# Patient Record
Sex: FEMALE | Race: WHITE | Employment: OTHER | URBAN - METROPOLITAN AREA
[De-identification: names, ages, dates, MRNs, and addresses within clinical notes are randomized per-mention and may not be internally consistent; named-entity substitution may affect disease eponyms.]

---

## 2017-04-13 ENCOUNTER — HOSPITAL ENCOUNTER (OUTPATIENT)
Dept: MAMMOGRAPHY | Age: 82
Discharge: HOME | End: 2017-04-13
Attending: FAMILY MEDICINE
Payer: MEDICARE

## 2017-04-13 DIAGNOSIS — Z78.0 POSTMENOPAUSAL: ICD-10-CM

## 2017-04-13 PROCEDURE — 77080 DXA BONE DENSITY AXIAL: CPT

## 2018-11-15 ENCOUNTER — HOSPITAL ENCOUNTER (OUTPATIENT)
Dept: GENERAL RADIOLOGY | Age: 83
End: 2018-11-15
Attending: FAMILY MEDICINE
Payer: MEDICARE

## 2018-11-15 ENCOUNTER — HOSPITAL ENCOUNTER (OUTPATIENT)
Dept: GENERAL RADIOLOGY | Age: 83
Discharge: HOME | End: 2018-11-15
Attending: FAMILY MEDICINE
Payer: MEDICARE

## 2018-11-15 DIAGNOSIS — R05.9 COUGH: ICD-10-CM

## 2018-11-15 PROCEDURE — 74247 XR UPPER GI W KUB AIR CONT: CPT

## 2018-11-15 PROCEDURE — 74011000255 HC RX REV CODE- 255: Performed by: FAMILY MEDICINE

## 2018-11-15 PROCEDURE — 74011000250 HC RX REV CODE- 250: Performed by: FAMILY MEDICINE

## 2018-11-15 RX ADMIN — BARIUM SULFATE 135 ML: 980 POWDER, FOR SUSPENSION ORAL at 13:03

## 2018-11-15 RX ADMIN — ANTACID/ANTIFLATULENT 4 G: 380; 550; 10; 10 GRANULE, EFFERVESCENT ORAL at 13:02

## 2019-02-13 ENCOUNTER — HOSPITAL ENCOUNTER (OUTPATIENT)
Dept: CT IMAGING | Age: 84
Discharge: HOME | End: 2019-02-13
Attending: FAMILY MEDICINE
Payer: MEDICARE

## 2019-02-13 DIAGNOSIS — R05.9 COUGH: ICD-10-CM

## 2019-02-13 DIAGNOSIS — J44.1 CHRONIC OBSTRUCTIVE PULMONARY DISEASE WITH ACUTE EXACERBATION (HCC): ICD-10-CM

## 2019-02-13 DIAGNOSIS — R93.89 ABNORMAL CHEST X-RAY: ICD-10-CM

## 2019-02-13 LAB — CREAT BLD-MCNC: 1.1 MG/DL (ref 0.8–1.5)

## 2019-02-13 PROCEDURE — 82565 ASSAY OF CREATININE: CPT

## 2019-02-13 RX ORDER — SODIUM CHLORIDE 0.9 % (FLUSH) 0.9 %
10 SYRINGE (ML) INJECTION
Status: COMPLETED | OUTPATIENT
Start: 2019-02-13 | End: 2019-02-13

## 2019-02-13 RX ADMIN — Medication 10 ML: at 13:05

## 2019-02-15 ENCOUNTER — HOSPITAL ENCOUNTER (OUTPATIENT)
Dept: LAB | Age: 84
Discharge: HOME | End: 2019-02-15
Payer: MEDICARE

## 2019-02-15 DIAGNOSIS — R93.89 ABNORMAL CT SCAN: ICD-10-CM

## 2019-02-15 PROCEDURE — 87116 MYCOBACTERIA CULTURE: CPT

## 2019-02-15 PROCEDURE — 87070 CULTURE OTHR SPECIMN AEROBIC: CPT

## 2019-02-15 PROCEDURE — 87102 FUNGUS ISOLATION CULTURE: CPT

## 2019-02-17 LAB
BACTERIA SPEC CULT: NORMAL
GRAM STN SPEC: NORMAL
SERVICE CMNT-IMP: NORMAL

## 2019-02-19 ENCOUNTER — HOSPITAL ENCOUNTER (OUTPATIENT)
Dept: LAB | Age: 84
Discharge: HOME | End: 2019-02-19
Payer: MEDICARE

## 2019-02-19 DIAGNOSIS — J47.0 BRONCHIECTASIS WITH ACUTE LOWER RESPIRATORY INFECTION (HCC): ICD-10-CM

## 2019-02-19 DIAGNOSIS — Z86.2 HISTORY OF GRANULOMATOUS DISEASE: ICD-10-CM

## 2019-02-19 DIAGNOSIS — R76.11 POSITIVE PPD: ICD-10-CM

## 2019-02-19 PROCEDURE — 86480 TB TEST CELL IMMUN MEASURE: CPT

## 2019-02-19 PROCEDURE — 87116 MYCOBACTERIA CULTURE: CPT

## 2019-02-19 PROCEDURE — 36415 COLL VENOUS BLD VENIPUNCTURE: CPT

## 2019-02-22 LAB
M TB IFN-G BLD-IMP: NEGATIVE
QUANTIFERON CRITERIA, QFI1T: NORMAL
QUANTIFERON INCUBATION, QF1T: NORMAL
QUANTIFERON MITOGEN VALUE: 6.51 IU/ML
QUANTIFERON NIL VALUE: 0.01 IU/ML
QUANTIFERON TB1 AG: 0.07 IU/ML
QUANTIFERON TB2 AG: 0.08 IU/ML

## 2019-02-28 NOTE — PROGRESS NOTES
Pt was notified that the sputum stains and quantiferon suggests there is no need for TB precaution or concern. She was informed that when final cultures are available we will call her.

## 2019-03-16 LAB
FUNGUS CULTURE, RFCO2T: NEGATIVE
FUNGUS SMEAR, RFCO1T: NORMAL
FUNGUS SPEC CULT: NORMAL
FUNGUS STAIN, 188244: NORMAL
REFLEX TO ID, RFCO3T: NORMAL
SPECIMEN SOURCE: NORMAL
SPECIMEN SOURCE: NORMAL

## 2019-03-27 PROBLEM — R91.1 PULMONARY NODULE: Status: ACTIVE | Noted: 2019-03-27

## 2019-03-27 PROBLEM — R05.3 CHRONIC COUGH: Status: ACTIVE | Noted: 2019-03-27

## 2019-03-27 PROBLEM — J47.9 BRONCHIECTASIS WITHOUT COMPLICATION (HCC): Status: ACTIVE | Noted: 2019-03-27

## 2019-03-29 LAB
ACID FAST STN SPEC: NEGATIVE
MYCOBACTERIUM SPEC QL CULT: NEGATIVE
SPECIMEN PREPARATION: NORMAL
SPECIMEN SOURCE: NORMAL

## 2019-04-03 LAB
ACID FAST STN SPEC: NEGATIVE
ACID FAST STN SPEC: NEGATIVE
MYCOBACTERIUM SPEC QL CULT: NEGATIVE
MYCOBACTERIUM SPEC QL CULT: NEGATIVE
SPECIMEN PREPARATION: NORMAL
SPECIMEN PREPARATION: NORMAL
SPECIMEN SOURCE: NORMAL
SPECIMEN SOURCE: NORMAL

## 2019-04-22 ENCOUNTER — HOSPITAL ENCOUNTER (OUTPATIENT)
Dept: MAMMOGRAPHY | Age: 84
Discharge: HOME | End: 2019-04-22
Attending: FAMILY MEDICINE
Payer: MEDICARE

## 2019-04-22 DIAGNOSIS — M94.9 DISORDER OF BONE AND CARTILAGE: ICD-10-CM

## 2019-04-22 DIAGNOSIS — M89.9 DISORDER OF BONE AND CARTILAGE: ICD-10-CM

## 2019-04-22 PROCEDURE — 77080 DXA BONE DENSITY AXIAL: CPT

## 2019-06-06 ENCOUNTER — HOSPITAL ENCOUNTER (OUTPATIENT)
Dept: CT IMAGING | Age: 84
Discharge: HOME | End: 2019-06-06
Attending: INTERNAL MEDICINE
Payer: MEDICARE

## 2019-06-06 DIAGNOSIS — R91.1 PULMONARY NODULE: ICD-10-CM

## 2019-06-06 DIAGNOSIS — J47.9 BRONCHIECTASIS WITHOUT COMPLICATION (HCC): ICD-10-CM

## 2019-06-06 PROCEDURE — 71250 CT THORAX DX C-: CPT

## 2019-06-10 NOTE — PROGRESS NOTES
Pt was notified that there is scarring in the upper lungs. One nodule has improved. There are more inflammatory areas in the bottom aspects of the lungs. Pt verbalized understanding. Pt has questions about the use of cough suppressants. She is requesting a return call.

## 2019-06-10 NOTE — PROGRESS NOTES
Reviewed for Dr. Minor King due to her vacation--CT is reviewed--scarring in upper lungs. One nodule is improved. There appears to be more inflammatory areas in the bottom aspects of lungs. This will be addressed at upcoming appt. Thank you.

## 2020-02-03 NOTE — PROGRESS NOTES
Physical Therapy Daily Treatment    Visit Count: 2    Precautions: Plan of Care: 1/27/2020 Through: 4/20/2020  Insurance Information: 20 Authorized  Referred by: Yousif Beck MD; Next provider visit (if known/scheduled): 2/12/2020  Medical Diagnosis (from order):  S/p arthroscopy of left hip  Treatment Diagnosis: Pelvic floor dysfunction and hip weakness with increased pain/symptoms, impaired muscle length/flexibility, impaired activity tolerance     Date of Surgery: 11/20/2019 Surgery Performed: Reconstruction, Pelvis; Physician Guidelines released from pelvic restrictions 1/21/2020   Diagnosis Precautions: 10-20 lifting restriction and then activity as tolerated easing back into normal activity.   Chart reviewed at time of initial evaluation (relevant co-morbidities, allergies, tests and medications listed): 7/25/2019 left hip arthroscopy, rim trimming, labral repair, femoral osteoplasty.  Avoid lunges/squats due to right knee pain.     SUBJECTIVE   Reports she has tightness in the left hip.    Current Pain (0-10 scale): 4   Functional Change: no change      Pain:   Left anterior hip pain aggravated by needing to kneel after recent surgery.  Onset about 3 weeks ago and staying about the same since onset.  Pain rated 4/10.  Ice helps.  Reports stiffness with sit to stand, and going up and down steps.      Patient Goals: to strengthen hip without compromising gynecological surgery.      OBJECTIVE   Pelvic Floor Assessment EXTERNAL/Visual Perineal Exam:           Finding Comment   Skin Integrity intact      Contraction Response substituted     Neuro/Anal Reflex       Tone with palpation normal        Range of Motion (degrees)    Left Right   Date       Hip Flexion (110-120)  125     Hip Extension (10-15)       Hip Abduction (30-50) 50 45   Hip Adduction (30)       Hip Internal Rotation (30-40) 38 40   Hip External Rotation (40-60) 40 40   Knee Flexion (135)       Knee Extension (0-5)       Ankle Dorsiflexion  Please notify patient that sputum stains and quantiferon suggests there is no need for TB precaution or concern. Will review final cultures when available.   Meghan Schofield MD (20)       Ankle Plantar Flexion (50)       Ankle Inversion (35)       Ankle Eversion (15)       Reported in degrees, active range of motion recorded unless noted as AA=active assistive or P=passive; standard testing positions unless otherwise noted, norms included in ( ); *=pain   Only those motions that were assessed are noted.  Muscle Flexibility   only deficits assessed reported below:    Left Right   Left Right   Adductors (long) x x Adductors (short) x xx   Piriformis      Hamstring       Iliopsoas x   Iliotibial Band       Rectus Femoris/Quadriceps     Gastrocnemius       Soleus             X=impairment assessed; amount of impairment maybe indicated by min=minimal impairment, mod=moderate impairment, sev=severe impairment; hamstring may be reported in 90/90 test as indicated by degrees     Biofeedback: per initial eval  Verbal informed consent was received today from patient for external pelvic floor muscle assessment and treatment. Patient was given alternative options. Benefits and drawbacks were explained. Third person was offered to be in room during session, patient declined.. Patient provided continual consent prior to and during evaluation and treatment.       Pelvic floor electrode: surface emg  Equipment: CTS 1500       PELVIC FLOOR   Baseline Resting  2.3 (normal is 2-4 microvolts)    Pelvic Floor Contraction    weak, isolated    Pelvic Floor Endurance  4 seconds  Repeat 3-5 times   Pelvic Floor Relaxation  delayed relaxation    Exercises See exercise instruction below   EMG readings in microvolts             Treatment   Manual Therapy:   [place] femoral grade 3 joint mobs left hip distraction, A/P, PA, lateral glide, internal rotation and external rotation.        Access Code: [email or web address]   URL: [email or web address]   Date: 02/03/2020   Prepared by: Ember Travis     Exercises   Single Knee to Chest Stretch - 3 reps - 1 sets - 30 hold - 1x daily - 5x weekly   Supine Butterfly Groin Stretch  - 3 reps - 1 sets - 30 hold - 1x daily - 5x weekly   Supine Lower Trunk Rotation - 3 reps - 1 sets - 30 hold - 1x daily - 5x weekly     Skilled input: as detailed above       Home Program:   As above    Writer verbally educated the patient and received verbal consent from the patient on hand placement, positioning of patient, and techniques to be performed today including therapist position for techniques, hand placement and palpation for techniques as described above and how they are pertinent to the patient's plan of care.      Suggestions for next session as indicated: progress per plan of care, continue hip joint mobilization, progress per plan of care, Progress pelvic floor muscle  to sitting if patient able. Continue biofeedback to assist in re-education of pelvic floor.  Progress hip/core strengthening once pt has re-established good strength of pelvic floor muscle        ASSESSMENT   Tolerated treatment well reporting hip felt better   Pain after treatment (patient reported, 0-10 scale):  Not rated  Result of above outlined education: Verbalizes understanding and Demonstrates understanding    Procedures and total treatment time documented in Time Entry flowsheet.

## 2020-07-02 ENCOUNTER — HOSPITAL ENCOUNTER (OUTPATIENT)
Dept: GENERAL RADIOLOGY | Age: 85
Discharge: HOME | End: 2020-07-02
Payer: MEDICARE

## 2020-07-02 DIAGNOSIS — R05.9 COUGH: ICD-10-CM

## 2020-07-02 PROCEDURE — 71046 X-RAY EXAM CHEST 2 VIEWS: CPT

## 2021-12-27 ENCOUNTER — HOSPITAL ENCOUNTER (OUTPATIENT)
Dept: ULTRASOUND IMAGING | Age: 86
Discharge: HOME | End: 2021-12-27
Attending: FAMILY MEDICINE
Payer: MEDICARE

## 2021-12-27 DIAGNOSIS — I73.9 ARTERIAL INSUFFICIENCY OF LOWER EXTREMITY (HCC): ICD-10-CM

## 2021-12-27 PROCEDURE — 93925 LOWER EXTREMITY STUDY: CPT

## 2022-01-11 ENCOUNTER — HOSPITAL ENCOUNTER (OUTPATIENT)
Dept: GENERAL RADIOLOGY | Age: 87
Discharge: HOME | End: 2022-01-11
Payer: MEDICARE

## 2022-01-11 DIAGNOSIS — R06.02 SOB (SHORTNESS OF BREATH): ICD-10-CM

## 2022-01-11 PROCEDURE — 71046 X-RAY EXAM CHEST 2 VIEWS: CPT

## 2022-01-21 ENCOUNTER — APPOINTMENT (OUTPATIENT)
Dept: ULTRASOUND IMAGING | Age: 87
DRG: 191 | End: 2022-01-21
Attending: INTERNAL MEDICINE
Payer: MEDICARE

## 2022-01-21 ENCOUNTER — HOSPITAL ENCOUNTER (INPATIENT)
Age: 87
LOS: 3 days | Discharge: HOME | DRG: 191 | End: 2022-01-24
Attending: EMERGENCY MEDICINE | Admitting: INTERNAL MEDICINE
Payer: MEDICARE

## 2022-01-21 ENCOUNTER — APPOINTMENT (OUTPATIENT)
Dept: GENERAL RADIOLOGY | Age: 87
DRG: 191 | End: 2022-01-21
Attending: EMERGENCY MEDICINE
Payer: MEDICARE

## 2022-01-21 DIAGNOSIS — J47.1 BRONCHIECTASIS WITH ACUTE EXACERBATION (HCC): Primary | ICD-10-CM

## 2022-01-21 DIAGNOSIS — R09.02 HYPOXIA: ICD-10-CM

## 2022-01-21 DIAGNOSIS — R60.9 PERIPHERAL EDEMA: ICD-10-CM

## 2022-01-21 DIAGNOSIS — N17.9 AKI (ACUTE KIDNEY INJURY) (HCC): ICD-10-CM

## 2022-01-21 PROBLEM — D64.9 ANEMIA: Status: ACTIVE | Noted: 2022-01-21

## 2022-01-21 PROBLEM — J44.1 COPD EXACERBATION (HCC): Status: ACTIVE | Noted: 2022-01-21

## 2022-01-21 PROBLEM — R60.0 LOWER EXTREMITY EDEMA: Status: ACTIVE | Noted: 2022-01-21

## 2022-01-21 PROBLEM — J18.9 COMMUNITY ACQUIRED PNEUMONIA: Status: ACTIVE | Noted: 2022-01-21

## 2022-01-21 LAB
ALBUMIN SERPL-MCNC: 2.7 G/DL (ref 3.2–4.6)
ALBUMIN/GLOB SERPL: 0.6 {RATIO} (ref 1.2–3.5)
ALP SERPL-CCNC: 63 U/L (ref 50–130)
ALT SERPL-CCNC: 19 U/L (ref 12–65)
ANION GAP SERPL CALC-SCNC: 5 MMOL/L (ref 7–16)
AST SERPL-CCNC: 30 U/L (ref 15–37)
ATRIAL RATE: 99 BPM
BASOPHILS # BLD: 0.1 K/UL (ref 0–0.2)
BASOPHILS NFR BLD: 1 % (ref 0–2)
BILIRUB SERPL-MCNC: 0.3 MG/DL (ref 0.2–1.1)
BNP SERPL-MCNC: 1378 PG/ML
BUN SERPL-MCNC: 30 MG/DL (ref 8–23)
CALCIUM SERPL-MCNC: 8.9 MG/DL (ref 8.3–10.4)
CALCULATED P AXIS, ECG09: 0 DEGREES
CALCULATED R AXIS, ECG10: 3 DEGREES
CALCULATED T AXIS, ECG11: 65 DEGREES
CHLORIDE SERPL-SCNC: 103 MMOL/L (ref 98–107)
CO2 SERPL-SCNC: 27 MMOL/L (ref 21–32)
COVID-19 RAPID TEST, COVR: NOT DETECTED
CREAT SERPL-MCNC: 1.93 MG/DL (ref 0.6–1)
DIAGNOSIS, 93000: NORMAL
DIFFERENTIAL METHOD BLD: ABNORMAL
EOSINOPHIL # BLD: 0.3 K/UL (ref 0–0.8)
EOSINOPHIL NFR BLD: 4 % (ref 0.5–7.8)
ERYTHROCYTE [DISTWIDTH] IN BLOOD BY AUTOMATED COUNT: 13 % (ref 11.9–14.6)
GLOBULIN SER CALC-MCNC: 4.9 G/DL (ref 2.3–3.5)
GLUCOSE SERPL-MCNC: 125 MG/DL (ref 65–100)
HCT VFR BLD AUTO: 30.2 % (ref 35.8–46.3)
HGB BLD-MCNC: 9.7 G/DL (ref 11.7–15.4)
IMM GRANULOCYTES # BLD AUTO: 0 K/UL (ref 0–0.5)
IMM GRANULOCYTES NFR BLD AUTO: 1 % (ref 0–5)
LACTATE SERPL-SCNC: 0.7 MMOL/L (ref 0.4–2)
LYMPHOCYTES # BLD: 0.2 K/UL (ref 0.5–4.6)
LYMPHOCYTES NFR BLD: 3 % (ref 13–44)
MCH RBC QN AUTO: 29 PG (ref 26.1–32.9)
MCHC RBC AUTO-ENTMCNC: 32.1 G/DL (ref 31.4–35)
MCV RBC AUTO: 90.1 FL (ref 79.6–97.8)
MONOCYTES # BLD: 0.7 K/UL (ref 0.1–1.3)
MONOCYTES NFR BLD: 9 % (ref 4–12)
NEUTS SEG # BLD: 6.6 K/UL (ref 1.7–8.2)
NEUTS SEG NFR BLD: 82 % (ref 43–78)
NRBC # BLD: 0 K/UL (ref 0–0.2)
P-R INTERVAL, ECG05: 140 MS
PLATELET # BLD AUTO: 225 K/UL (ref 150–450)
PMV BLD AUTO: 8 FL (ref 9.4–12.3)
POTASSIUM SERPL-SCNC: 4.5 MMOL/L (ref 3.5–5.1)
PROCALCITONIN SERPL-MCNC: <0.05 NG/ML (ref 0–0.49)
PROT SERPL-MCNC: 7.6 G/DL (ref 6.3–8.2)
Q-T INTERVAL, ECG07: 336 MS
QRS DURATION, ECG06: 68 MS
QTC CALCULATION (BEZET), ECG08: 431 MS
RBC # BLD AUTO: 3.35 M/UL (ref 4.05–5.2)
SODIUM SERPL-SCNC: 135 MMOL/L (ref 136–145)
SOURCE, COVRS: NORMAL
VENTRICULAR RATE, ECG03: 99 BPM
WBC # BLD AUTO: 8 K/UL (ref 4.3–11.1)

## 2022-01-21 PROCEDURE — 80053 COMPREHEN METABOLIC PANEL: CPT

## 2022-01-21 PROCEDURE — 96375 TX/PRO/DX INJ NEW DRUG ADDON: CPT

## 2022-01-21 PROCEDURE — 84145 PROCALCITONIN (PCT): CPT

## 2022-01-21 PROCEDURE — 93005 ELECTROCARDIOGRAM TRACING: CPT | Performed by: EMERGENCY MEDICINE

## 2022-01-21 PROCEDURE — 96374 THER/PROPH/DIAG INJ IV PUSH: CPT

## 2022-01-21 PROCEDURE — 65270000029 HC RM PRIVATE

## 2022-01-21 PROCEDURE — 74011000250 HC RX REV CODE- 250: Performed by: EMERGENCY MEDICINE

## 2022-01-21 PROCEDURE — 71045 X-RAY EXAM CHEST 1 VIEW: CPT

## 2022-01-21 PROCEDURE — 83605 ASSAY OF LACTIC ACID: CPT

## 2022-01-21 PROCEDURE — 94640 AIRWAY INHALATION TREATMENT: CPT

## 2022-01-21 PROCEDURE — 83880 ASSAY OF NATRIURETIC PEPTIDE: CPT

## 2022-01-21 PROCEDURE — 99285 EMERGENCY DEPT VISIT HI MDM: CPT

## 2022-01-21 PROCEDURE — 74011000258 HC RX REV CODE- 258: Performed by: INTERNAL MEDICINE

## 2022-01-21 PROCEDURE — 93970 EXTREMITY STUDY: CPT

## 2022-01-21 PROCEDURE — 85025 COMPLETE CBC W/AUTO DIFF WBC: CPT

## 2022-01-21 PROCEDURE — 74011250636 HC RX REV CODE- 250/636: Performed by: EMERGENCY MEDICINE

## 2022-01-21 PROCEDURE — 87635 SARS-COV-2 COVID-19 AMP PRB: CPT

## 2022-01-21 PROCEDURE — 87040 BLOOD CULTURE FOR BACTERIA: CPT

## 2022-01-21 PROCEDURE — 94762 N-INVAS EAR/PLS OXIMTRY CONT: CPT

## 2022-01-21 PROCEDURE — 81003 URINALYSIS AUTO W/O SCOPE: CPT

## 2022-01-21 PROCEDURE — 74011000250 HC RX REV CODE- 250: Performed by: INTERNAL MEDICINE

## 2022-01-21 PROCEDURE — 74011250636 HC RX REV CODE- 250/636: Performed by: INTERNAL MEDICINE

## 2022-01-21 RX ORDER — IPRATROPIUM BROMIDE AND ALBUTEROL SULFATE 2.5; .5 MG/3ML; MG/3ML
3 SOLUTION RESPIRATORY (INHALATION)
Status: COMPLETED | OUTPATIENT
Start: 2022-01-21 | End: 2022-01-21

## 2022-01-21 RX ORDER — PROMETHAZINE HYDROCHLORIDE 25 MG/1
12.5 TABLET ORAL
Status: DISCONTINUED | OUTPATIENT
Start: 2022-01-21 | End: 2022-01-24 | Stop reason: HOSPADM

## 2022-01-21 RX ORDER — FUROSEMIDE 10 MG/ML
40 INJECTION INTRAMUSCULAR; INTRAVENOUS
Status: COMPLETED | OUTPATIENT
Start: 2022-01-21 | End: 2022-01-21

## 2022-01-21 RX ORDER — ACETAMINOPHEN 325 MG/1
650 TABLET ORAL
Status: DISCONTINUED | OUTPATIENT
Start: 2022-01-21 | End: 2022-01-24 | Stop reason: HOSPADM

## 2022-01-21 RX ORDER — ONDANSETRON 2 MG/ML
4 INJECTION INTRAMUSCULAR; INTRAVENOUS
Status: DISCONTINUED | OUTPATIENT
Start: 2022-01-21 | End: 2022-01-24 | Stop reason: HOSPADM

## 2022-01-21 RX ORDER — HEPARIN SODIUM 5000 [USP'U]/ML
5000 INJECTION, SOLUTION INTRAVENOUS; SUBCUTANEOUS EVERY 8 HOURS
Status: DISCONTINUED | OUTPATIENT
Start: 2022-01-21 | End: 2022-01-24 | Stop reason: HOSPADM

## 2022-01-21 RX ORDER — IPRATROPIUM BROMIDE AND ALBUTEROL SULFATE 2.5; .5 MG/3ML; MG/3ML
3 SOLUTION RESPIRATORY (INHALATION)
Status: DISCONTINUED | OUTPATIENT
Start: 2022-01-21 | End: 2022-01-24 | Stop reason: HOSPADM

## 2022-01-21 RX ORDER — SODIUM CHLORIDE 0.9 % (FLUSH) 0.9 %
5-10 SYRINGE (ML) INJECTION AS NEEDED
Status: DISCONTINUED | OUTPATIENT
Start: 2022-01-21 | End: 2022-01-24 | Stop reason: HOSPADM

## 2022-01-21 RX ORDER — ACETAMINOPHEN 650 MG/1
650 SUPPOSITORY RECTAL
Status: DISCONTINUED | OUTPATIENT
Start: 2022-01-21 | End: 2022-01-24 | Stop reason: HOSPADM

## 2022-01-21 RX ORDER — BUDESONIDE AND FORMOTEROL FUMARATE DIHYDRATE 80; 4.5 UG/1; UG/1
2 AEROSOL RESPIRATORY (INHALATION)
Status: DISCONTINUED | OUTPATIENT
Start: 2022-01-21 | End: 2022-01-24 | Stop reason: HOSPADM

## 2022-01-21 RX ORDER — SODIUM CHLORIDE 0.9 % (FLUSH) 0.9 %
5-40 SYRINGE (ML) INJECTION AS NEEDED
Status: DISCONTINUED | OUTPATIENT
Start: 2022-01-21 | End: 2022-01-24 | Stop reason: HOSPADM

## 2022-01-21 RX ORDER — ALBUTEROL SULFATE 0.83 MG/ML
2.5 SOLUTION RESPIRATORY (INHALATION)
Status: DISCONTINUED | OUTPATIENT
Start: 2022-01-21 | End: 2022-01-24 | Stop reason: HOSPADM

## 2022-01-21 RX ORDER — FUROSEMIDE 10 MG/ML
40 INJECTION INTRAMUSCULAR; INTRAVENOUS DAILY
Status: DISCONTINUED | OUTPATIENT
Start: 2022-01-21 | End: 2022-01-24 | Stop reason: HOSPADM

## 2022-01-21 RX ORDER — SODIUM CHLORIDE 0.9 % (FLUSH) 0.9 %
5-10 SYRINGE (ML) INJECTION EVERY 8 HOURS
Status: DISCONTINUED | OUTPATIENT
Start: 2022-01-21 | End: 2022-01-24 | Stop reason: HOSPADM

## 2022-01-21 RX ORDER — SODIUM CHLORIDE 0.9 % (FLUSH) 0.9 %
5-40 SYRINGE (ML) INJECTION EVERY 8 HOURS
Status: DISCONTINUED | OUTPATIENT
Start: 2022-01-21 | End: 2022-01-24 | Stop reason: HOSPADM

## 2022-01-21 RX ORDER — POLYETHYLENE GLYCOL 3350 17 G/17G
17 POWDER, FOR SOLUTION ORAL DAILY PRN
Status: DISCONTINUED | OUTPATIENT
Start: 2022-01-21 | End: 2022-01-24 | Stop reason: HOSPADM

## 2022-01-21 RX ADMIN — SODIUM CHLORIDE, PRESERVATIVE FREE 10 ML: 5 INJECTION INTRAVENOUS at 20:39

## 2022-01-21 RX ADMIN — METHYLPREDNISOLONE SODIUM SUCCINATE 125 MG: 125 INJECTION, POWDER, FOR SOLUTION INTRAMUSCULAR; INTRAVENOUS at 17:32

## 2022-01-21 RX ADMIN — FUROSEMIDE 40 MG: 10 INJECTION, SOLUTION INTRAMUSCULAR; INTRAVENOUS at 20:38

## 2022-01-21 RX ADMIN — PIPERACILLIN AND TAZOBACTAM 3.38 G: 3; .375 INJECTION, POWDER, LYOPHILIZED, FOR SOLUTION INTRAVENOUS at 20:39

## 2022-01-21 RX ADMIN — SODIUM CHLORIDE, PRESERVATIVE FREE 10 ML: 5 INJECTION INTRAVENOUS at 21:55

## 2022-01-21 RX ADMIN — FUROSEMIDE 40 MG: 10 INJECTION, SOLUTION INTRAMUSCULAR; INTRAVENOUS at 17:32

## 2022-01-21 RX ADMIN — IPRATROPIUM BROMIDE AND ALBUTEROL SULFATE 3 ML: .5; 3 SOLUTION RESPIRATORY (INHALATION) at 17:51

## 2022-01-21 RX ADMIN — HEPARIN SODIUM 5000 UNITS: 5000 INJECTION INTRAVENOUS; SUBCUTANEOUS at 22:00

## 2022-01-21 NOTE — ED TRIAGE NOTES
Patient advises diagnosed with pneumonia on 1/11 after a two week illness with cough, shortness of breath and fever. Patient with Xray that confirmed pneumonia  With 500 mg rocephin IM and augmentin 785 mg bid x 10 days. Patient with no improvement at this time. Masked.

## 2022-01-21 NOTE — H&P
Hospitalist History and Physical   Admit Date:  2022  5:12 PM   Name:  Deon Antonio   Age:  80 y.o. Sex:  female  :  1931   MRN:  [id number]   Room:  [place] [address]    Presenting Complaint: Shortness of Breath and Pneumonia    Reason(s) for Admission: Community acquired pneumonia [J18.9]  Lower extremity edema [R60.0]     History of Present Illness:   Deon Antonio is a 80 y.o. female with medical history of COPD, hypothyroidism, hyperlipidemia, who presented with low-grade fevers, cough with white productive sputum, lower extremity swelling and shortness of breath. Patient was diagnosed with pneumonia on  by her pulmonologist and was given Augmentin. Reports that her symptoms have not resolved. Initially was having adductive cough with green sputum prior to seeing them pulmonologist, now is having only white sputum. Reports having frequent coughing spells with white productive sputum, along with low-grade fevers. Also complains of bilateral lower extremity swelling and has done lower extremity arterial duplex which showed moderate stenosis of bilateral common femoral, deep femoral and superficial femoral arteries have moderate stenosis. She is pending vascular surgery follow-up appointment. Patient has been vaccinated for COVID-19 with Pfizer vaccine. In the ED, patient was hemodynamically stable noted to be hypertensive to 206/84 with ambulatory desaturation down to 87%. Currently on 2 L nasal cannula and saturating in the 100% at rest.  Laboratory work-up is remarkable for hemoglobin of 9.7 with prior hemoglobin of 11-12 a few days ago, BUN 30, creatinine 1.93, proBNP of 1378, procalcitonin less than 0.05. Chest x-ray with interval improvement in the right basilar pneumonia. Review of Systems:  10 systems reviewed and negative except as noted in HPI.   Assessment & Plan:     RLL Pneumonia  Likely failed outpt therapy with PO abx with improvement of PNA on CXR but procalc is < 0.05. Rapid COVID is neg  - start zosyn     Dyspnea  Multifactorial. Possible due to overload with elevated proBNP, right lower lobe pneumonia, COPD  - O2 supplement as needed  - will require walk test prior to dc      LE swelling  lower extremity arterial duplex which showed moderate stenosis of bilateral common femoral, deep femoral and superficial femoral arteries have moderate stenosis. - pending outpatient Vascular follow up  - check dopplers to r/o DVT  - check echo for ? chf  - lasix 40mg daily   - i&o      COPD , likely in acute exacerbation  Bronchiectasis without complications  Wheezing on exam, patient is status post Solu-Medrol 125mg in the ED.  - start on solumedrol IV BID  - nebs + flutter valve    Anemia  Hb of 9.7 on admission with prior Hb of 12-11  - check iron panel and fobt    CKD stage4  Cr of 1.93 on admission with baseline 1.78-1.5  - hold losartan-hctz  - monitor closely as patient needs lasix    Hypothyriodism: on synthroid  HTN  - continue norvasc  - hold losartan-hctz  - start lasix as above      Diet: ADULT DIET Regular  VTE ppx: heparin sq  Code status: Full Code    Hospital Problems as of 1/21/2022 Date Reviewed: 12/30/2021          Codes Class Noted - Resolved POA    Lower extremity edema ICD-10-CM: R60.0  ICD-9-CM: 782.3  1/21/2022 - Present Yes        Community acquired pneumonia ICD-10-CM: J18.9  ICD-9-CM: 029  1/21/2022 - Present Yes        COPD exacerbation ([facility] ICD-10-CM: J44.1  ICD-9-CM: 491.21  1/21/2022 - Present Yes        Anemia ICD-10-CM: D64.9  ICD-9-CM: 285.9  1/21/2022 - Present Yes        Bronchiectasis without complication ([facility] VPI-65-RQ: J47.9  ICD-9-CM: 494.0  3/27/2019 - Present Yes        Hypothyroidism due to acquired atrophy of thyroid ICD-10-CM: E03.4  ICD-9-CM: 244.8, 246.8  10/21/2015 - Present Yes        COPD (chronic obstructive pulmonary disease) ([facility] ICD-10-CM: J44.9  ICD-9-CM: 314  Unknown - Present Yes        Hyperlipidemia ICD-10-CM: E78.5  ICD-9-CM: 272.4  Unknown - Present Yes              Past History:  Past Medical History:   Diagnosis Date    Allergic rhinitis     COPD (chronic obstructive pulmonary disease) ([facility]     Gastroesophageal reflux     Heel spur     treated with excision    History of bilateral cataract extraction     History of hemorrhoids     History of total hysterectomy     Hyperlipidemia     Hypothyroidism     Idiopathic scoliosis     Insomnia     Mammogram not needed 01/2012    wnl per pt, no longer needs d/t age     Past Surgical History:   Procedure Laterality Date    HX CATARACT REMOVAL  1996,2000    HX HEMORRHOIDECTOMY      HX ORTHOPAEDIC  1992    HEEL SPUR     HX SAKSHI AND BSO  1979      Allergies   Allergen Reactions    Biaxin [Clarithromycin] Rash    Other Medication Rash     BLAXIN      Social History     Tobacco Use    Smoking status: Passive Smoke Exposure - Never Smoker    Smokeless tobacco: Never Used    Tobacco comment: pt's  is former smoker   Substance Use Topics    Alcohol use: No     Alcohol/week: 0.0 standard drinks      Family History   Problem Relation Age of Onset    Tuberculosis Father     Diabetes Brother     Heart Disease Mother         leaky valve      Family history reviewed and negative except as noted above.     Immunization History   Administered Date(s) Administered    COVID-19, Pfizer Purple top, DILUTE for use, 12+ yrs, 30mcg/0.3mL dose 01/22/2021, 02/12/2021, 11/10/2021    Influenza High Dose Vaccine PF 09/10/2018, 09/21/2019, 09/01/2020, 10/01/2021    Influenza Vaccine 09/15/2014    Influenza Vaccine (Tri) Adjuvanted (>65 Yrs FLUAD TRI 20018) 09/21/2019    Influenza, High-dose, Quadrivalent (>65 Yrs Fluzone High Dose Quad 54961) 09/02/2020    Pneumococcal Conjugate (PCV-13) 04/04/2017    Pneumococcal Polysaccharide (PPSV-23) 04/18/2019    Zoster Recombinant 11/08/2019     Prior to Admit Medications:  Current Outpatient Medications   Medication Instructions    albuterol (ProAir HFA) 90 mcg/actuation inhaler 2 Puffs, Inhalation, EVERY 4 HOURS AS NEEDED    albuterol (PROVENTIL VENTOLIN) 2.5 mg /3 mL (0.083 %) nebu USE 1 VIAL BY NEBULIZER EVERY 4 HOURS AS NEEDED FOR WHEEZING.     amLODIPine (NORVASC) 5 mg, Oral, DAILY    amoxicillin-clavulanate (AUGMENTIN) 875-125 mg per tablet 1 Tablet, Oral, 2 TIMES DAILY    ASCORBATE CALCIUM (VITAMIN C PO) Oral, DAILY    aspirin delayed-release 81 mg tablet Oral, DAILY    B.infantis-B.ani-B.long-B.bifi (PROBIOTIC 4X) 10-15 mg TbEC Oral    BIOTIN PO Oral, DAILY    CALCIUM CARBONATE/VITAMIN D3 (CALTRATE 600 + D PO) Oral, DAILY    cilostazoL (PLETAL) 100 mg, Oral, 2 TIMES DAILY BEFORE MEALS    fluticasone (FLONASE) 50 mcg/actuation nasal spray 1 [place], Both Nostrils, DAILY    FOLIC ACID/MULTIVIT-MIN/LUTEIN (CENTRUM SILVER PO) Oral    GLUCOSAMINE/CHONDROITIN SULF A (GLUCOSAMINE-CHONDROITIN PO) Oral, DAILY    levothyroxine (SYNTHROID) 75 mcg, Oral, DAILY BEFORE BREAKFAST    losartan-hydroCHLOROthiazide (HYZAAR) 50-12.5 mg per tablet 1 Tablet, Oral, DAILY    lutein 20 mg cap Oral, DAILY    Magnesium Oxide 500 mg cap Oral    montelukast (SINGULAIR) 10 mg tablet TAKE 1 TABLET BY MOUTH  DAILY    Mucus Clearing Device (FLUTTER) byron Use twice daily with albuterol nebulizer    omega 3-dha-epa-fish oil (FISH OIL) 100-160-1,000 mg cap Oral    omeprazole (PRILOSEC) 20 mg, Oral, DAILY    raloxifene (EVISTA) 60 mg tablet Take 1 tablet by mouth once daily    raloxifene (EVISTA) 60 mg, Oral, DAILY    triamcinolone acetonide (KENALOG) 0.1 % topical cream APPLY TO THE AFFECTED AREA ON LEGS TWICE DAILY    UBIDECARENONE (CO Q-10 PO) Oral, DAILY       Objective:     Patient Vitals for the past 24 hrs:   Temp Pulse Resp BP SpO2   01/21/22 1752     100 %   01/21/22 1730  99 (!) 42 (!) 191/77 96 %   01/21/22 1717    (!) 206/84 96 %   01/21/22 1526 99.2 °F (37.3 °C) (!) 102 16 (!) 142/54 91 %     Oxygen Therapy  O2 Sat (%): 100 % (01/21/22 1752)  Pulse via Oximetry: 97 beats per minute (01/21/22 1752)  O2 Device: Nasal cannula (01/21/22 1752)  O2 Flow Rate (L/min): 2 l/min (01/21/22 1752)    Estimated body mass index is 19.47 kg/m² as calculated from the following:    Height as of this encounter: 5' 5\" (1.651 m). Weight as of this encounter: 53.1 kg (117 lb). No intake or output data in the 24 hours ending 01/21/22 1905      Physical Exam:    Blood pressure (!) 191/77, pulse 99, temperature 99.2 °F (37.3 °C), resp. rate (!) 42, height 5' 5\" (1.651 m), weight 53.1 kg (117 lb), SpO2 100 %. General:    Well nourished. No overt distress  Head:  Normocephalic, atraumatic  Eyes:  Sclerae appear normal.  Pupils equally round. ENT:  Nares appear normal, no drainage. Moist oral mucosa  Neck:  No restricted ROM. Trachea midline   CV:   RRR. No jugular venous distension. Lungs:   Scattered crackles, +wheezing  Abdomen: Bowel sounds present. Soft, nontender, nondistended. Extremities: No cyanosis or clubbing. 2+ edema  Skin:     No rashes and normal coloration. Warm and dry. Neuro:  CN II-XII grossly intact. A&Ox3  Psych:  Normal mood and affect.       I have reviewed ordered lab tests and independently visualized imaging below:    Last 24hr Labs:  Recent Results (from the past 24 hour(s))   COVID-19 RAPID TEST    Collection Time: 01/21/22  3:32 PM   Result Value Ref Range    Specimen source NP Swab      COVID-19 rapid test Not detected NOTD     EKG, 12 LEAD, INITIAL    Collection Time: 01/21/22  4:07 PM   Result Value Ref Range    Ventricular Rate 99 BPM    Atrial Rate 99 BPM    P-R Interval 140 ms    QRS Duration 68 ms    Q-T Interval 336 ms    QTC Calculation (Bezet) 431 ms    Calculated P Axis 0 degrees    Calculated R Axis 3 degrees    Calculated T Axis 65 degrees    Diagnosis       Normal sinus rhythm  Possible Anterior infarct , age undetermined  Abnormal ECG  No previous ECGs available     LACTIC ACID Collection Time: 01/21/22  4:11 PM   Result Value Ref Range    Lactic acid 0.7 0.4 - 2.0 MMOL/L   CBC WITH AUTOMATED DIFF    Collection Time: 01/21/22  4:11 PM   Result Value Ref Range    WBC 8.0 4.3 - 11.1 K/uL    RBC 3.35 (L) 4.05 - 5.2 M/uL    HGB 9.7 (L) 11.7 - 15.4 g/dL    HCT 30.2 (L) 35.8 - 46.3 %    MCV 90.1 79.6 - 97.8 FL    MCH 29.0 26.1 - 32.9 PG    MCHC 32.1 31.4 - 35.0 g/dL    RDW 13.0 11.9 - 14.6 %    PLATELET 529 338 - 666 K/uL    MPV 8.0 (L) 9.4 - 12.3 FL    ABSOLUTE NRBC 0.00 0.0 - 0.2 K/uL    NEUTROPHILS 82 (H) 43 - 78 %    LYMPHOCYTES 3 (L) 13 - 44 %    MONOCYTES 9 4.0 - 12.0 %    EOSINOPHILS 4 0.5 - 7.8 %    BASOPHILS 1 0.0 - 2.0 %    IMMATURE GRANULOCYTES 1 0.0 - 5.0 %    ABS. NEUTROPHILS 6.6 1.7 - 8.2 K/UL    ABS. LYMPHOCYTES 0.2 (L) 0.5 - 4.6 K/UL    ABS. MONOCYTES 0.7 0.1 - 1.3 K/UL    ABS. EOSINOPHILS 0.3 0.0 - 0.8 K/UL    ABS. BASOPHILS 0.1 0.0 - 0.2 K/UL    ABS. IMM. GRANS. 0.0 0.0 - 0.5 K/UL    DF AUTOMATED     METABOLIC PANEL, COMPREHENSIVE    Collection Time: 01/21/22  4:11 PM   Result Value Ref Range    Sodium 135 (L) 136 - 145 mmol/L    Potassium 4.5 3.5 - 5.1 mmol/L    Chloride 103 98 - 107 mmol/L    CO2 27 21 - 32 mmol/L    Anion gap 5 (L) 7 - 16 mmol/L    Glucose 125 (H) 65 - 100 mg/dL    BUN 30 (H) 8 - 23 MG/DL    Creatinine 1.93 (H) 0.6 - 1.0 MG/DL    GFR est AA 31 (L) >60 ml/min/1.73m2    GFR est non-AA 26 (L) >60 ml/min/1.73m2    Calcium 8.9 8.3 - 10.4 MG/DL    Bilirubin, total 0.3 0.2 - 1.1 MG/DL    ALT (SGPT) 19 12 - 65 U/L    AST (SGOT) 30 15 - 37 U/L    Alk.  phosphatase 63 50 - 130 U/L    Protein, total 7.6 6.3 - 8.2 g/dL    Albumin 2.7 (L) 3.2 - 4.6 g/dL    Globulin 4.9 (H) 2.3 - 3.5 g/dL    A-G Ratio 0.6 (L) 1.2 - 3.5     PROCALCITONIN    Collection Time: 01/21/22  4:11 PM   Result Value Ref Range    Procalcitonin <0.05 0.00 - 0.49 ng/mL   NT-PRO BNP    Collection Time: 01/21/22  4:17 PM   Result Value Ref Range    NT pro-BNP 1,378 (H) <450 PG/ML       All Micro Results Procedure Component Value Units Date/Time    BLOOD CULTURE [[id number]] Collected: 01/21/22 1726    Order Status: Completed Specimen: Blood Updated: 01/21/22 1743    BLOOD CULTURE [[id number]] Collected: 01/21/22 1605    Order Status: Completed Specimen: Blood Updated: 01/21/22 1639    COVID-19 RAPID TEST [[id number]] Collected: 01/21/22 1532    Order Status: Completed Specimen: Nasopharyngeal Updated: 01/21/22 1556     Specimen source NP Swab        COVID-19 rapid test Not detected        Comment:      The specimen is NEGATIVE for SARS-CoV-2, the novel coronavirus associated with COVID-19. A negative result does not rule out COVID-19. This test has been authorized by the FDA under an Emergency Use Authorization (EUA) for use by authorized laboratories. Fact sheet for Healthcare Providers: [employer]  Fact sheet for Patients: [employer]       Methodology: Isothermal Nucleic Acid Amplification               Other Studies:  XR CHEST PORT    Result Date: 1/21/2022  PORTABLE CHEST, January 21, 2022 at 1556 hours CLINICAL HISTORY:  Cough, shortness of breath, and fever for 2 weeks. COMPARISON:  January 11, 2022 July 2, 2020 FINDINGS: PA erect image demonstrates patchy right basilar infiltrate which is decreased from January 11. Very small right pleural effusion persists. The heart size is within normal limits without evidence of congestive heart failure or pneumothorax. The bony thorax appears intact on this view. There are overlying radiopaque support devices. INTERVAL IMPROVEMENT IN RIGHT BASILAR PNEUMONIA.       Medications Administered     albuterol-ipratropium (DUO-NEB) 2.5 MG-0.5 MG/3 ML     Admin Date  01/21/2022 Action  Given Dose  3 mL Route  Nebulization Administered By  Shabbir Fung RT          furosemide (LASIX) injection 40 mg     Admin Date  01/21/2022 Action  Given Dose  40 mg Route  IntraVENous Administered By  Jcarlos NDIAYE          methylPREDNISolone (PF) (Solu-MEDROL) injection 125 mg     Admin Date  01/21/2022 Action  Given Dose  125 mg Route  IntraVENous Administered By  Sharmaine Hickey                Signed:  Jesica Lara MD    Part of this note may have been written by using a voice dictation software. The note has been proof read but may still contain some grammatical/other typographical errors.

## 2022-01-21 NOTE — ED PROVIDER NOTES
24-year-old female with history of bronchiectasis and COPD was diagnosed with pneumonia about 10 days ago. She has had a persistent cough with white sputum and increasing shortness of breath. She was given Rocephin and has been on Augmentin without improvement. Denies prednisone use. She is using inhalers without change. She is not on home oxygen. Has had bilateral leg swelling for the past month. Ultrasound showed arterial disease and is awaiting vascular follow-up. Denies history of congestive heart failure. She reports low-grade fevers around 99 for the past 2 weeks. Shortness of Breath  Associated symptoms include a fever, cough and leg swelling. Pertinent negatives include no headaches, no chest pain, no vomiting, no abdominal pain and no rash.         Past Medical History:   Diagnosis Date    Allergic rhinitis     COPD (chronic obstructive pulmonary disease) (HCC)     Gastroesophageal reflux     Heel spur     treated with excision    History of bilateral cataract extraction     History of hemorrhoids     History of total hysterectomy     Hyperlipidemia     Hypothyroidism     Idiopathic scoliosis     Insomnia     Mammogram not needed 01/2012    wnl per pt, no longer needs d/t age       Past Surgical History:   Procedure Laterality Date    HX CATARACT REMOVAL  1996,2000    HX HEMORRHOIDECTOMY      HX ORTHOPAEDIC  1992    HEEL SPUR     HX SAKSHI AND BSO  1979         Family History:   Problem Relation Age of Onset    Tuberculosis Father     Diabetes Brother     Heart Disease Mother         leaky valve       Social History     Socioeconomic History    Marital status:      Spouse name: Not on file    Number of children: Not on file    Years of education: Not on file    Highest education level: Not on file   Occupational History    Not on file   Tobacco Use    Smoking status: Passive Smoke Exposure - Never Smoker    Smokeless tobacco: Never Used    Tobacco comment: pt's  is former smoker   Vaping Use    Vaping Use: Never used   Substance and Sexual Activity    Alcohol use: No     Alcohol/week: 0.0 standard drinks    Drug use: Not on file    Sexual activity: Not on file   Other Topics Concern    Not on file   Social History Narrative    Not on file     Social Determinants of Health     Financial Resource Strain:     Difficulty of Paying Living Expenses: Not on file   Food Insecurity:     Worried About Running Out of Food in the Last Year: Not on file    Shen of Food in the Last Year: Not on file   Transportation Needs:     Lack of Transportation (Medical): Not on file    Lack of Transportation (Non-Medical): Not on file   Physical Activity:     Days of Exercise per Week: Not on file    Minutes of Exercise per Session: Not on file   Stress:     Feeling of Stress : Not on file   Social Connections:     Frequency of Communication with Friends and Family: Not on file    Frequency of Social Gatherings with Friends and Family: Not on file    Attends Yarsanism Services: Not on file    Active Member of [address] [place] [address] or Organizations: Not on file    Attends Club or Organization Meetings: Not on file    Marital Status: Not on file   Intimate Partner Violence:     Fear of Current or Ex-Partner: Not on file    Emotionally Abused: Not on file    Physically Abused: Not on file    Sexually Abused: Not on file   Housing Stability:     Unable to Pay for Housing in the Last Year: Not on file    Number of [place] in the Last Year: Not on file    Unstable Housing in the Last Year: Not on file         ALLERGIES: Biaxin [clarithromycin] and Other medication    Review of Systems   Constitutional: Positive for fatigue and fever. HENT: Positive for congestion. Negative for hearing loss. Eyes: Negative for visual disturbance. Respiratory: Positive for cough and shortness of breath. Cardiovascular: Positive for leg swelling. Negative for chest pain. Gastrointestinal: Negative for abdominal pain, diarrhea, nausea and vomiting. Musculoskeletal: Negative for back pain. Skin: Negative for rash. Neurological: Negative for headaches. Psychiatric/Behavioral: Negative for confusion. All other systems reviewed and are negative. Vitals:    01/21/22 1526   BP: (!) 142/54   Pulse: (!) 102   Resp: 16   Temp: 99.2 °F (37.3 °C)   SpO2: 91%   Weight: 53.1 kg (117 lb)   Height: 5' 5\" (1.651 m)            Physical Exam  Vitals and nursing note reviewed. Constitutional:       Appearance: Normal appearance. She is well-developed. HENT:      Head: Normocephalic and atraumatic. Nose: Nose normal.      Mouth/Throat:      Mouth: Mucous membranes are moist.   Eyes:      Pupils: Pupils are equal, round, and reactive to light. Cardiovascular:      Rate and Rhythm: Regular rhythm. Tachycardia present. Heart sounds: Normal heart sounds. Pulmonary:      Effort: Pulmonary effort is normal. Tachypnea present. Breath sounds: Wheezing and rhonchi present. Abdominal:      Palpations: Abdomen is soft. Tenderness: There is no abdominal tenderness. Musculoskeletal:         General: No deformity. Normal range of motion. Cervical back: Normal range of motion and neck supple. Right lower leg: Edema present. Left lower leg: Edema present. Skin:     General: Skin is warm and dry. Neurological:      General: No focal deficit present. Mental Status: She is alert. Mental status is at baseline. Psychiatric:         Mood and Affect: Mood normal.         Behavior: Behavior normal.          MDM  Number of Diagnoses or Management Options  Diagnosis management comments: Parts of this document were created using dragon voice recognition software. The chart has been reviewed but errors may still be present. I wore appropriate PPE throughout this patient's ED visit. Maximo Washington MD, 5:30 PM    Improving pneumonia, but with hypoxia. Sats 88% when walking to the room. Placed on 2 L.  COVID-negative. Will give Solu-Medrol and neb. Discussed with hospitalist for admission. Amount and/or Complexity of Data Reviewed  Clinical lab tests: ordered and reviewed (Results for orders placed or performed during the hospital encounter of 01/21/22  -COVID-19 RAPID TEST:        Result                      Value             Ref Range           Specimen source             NP Swab                               COVID-19 rapid test         Not detected      NOTD           -LACTIC ACID:        Result                      Value             Ref Range           Lactic acid                 0.7               0.4 - 2.0 MM*  -CBC WITH AUTOMATED DIFF:        Result                      Value             Ref Range           WBC                         8.0               4.3 - 11.1 K*       RBC                         3.35 (L)          4.05 - 5.2 M*       HGB                         9.7 (L)           11.7 - 15.4 *       HCT                         30.2 (L)          35.8 - 46.3 %       MCV                         90.1              79.6 - 97.8 *       MCH                         29.0              26.1 - 32.9 *       MCHC                        32.1              31.4 - 35.0 *       RDW                         13.0              11.9 - 14.6 %       PLATELET                    225               150 - 450 K/*       MPV                         8.0 (L)           9.4 - 12.3 FL       ABSOLUTE NRBC               0.00              0.0 - 0.2 K/*       NEUTROPHILS                 82 (H)            43 - 78 %           LYMPHOCYTES                 3 (L)             13 - 44 %           MONOCYTES                   9                 4.0 - 12.0 %        EOSINOPHILS                 4                 0.5 - 7.8 %         BASOPHILS                   1                 0.0 - 2.0 %         IMMATURE GRANULOCYTES       1                 0.0 - 5.0 %         ABS.  NEUTROPHILS            6.6 1.7 - 8.2 K/*       ABS. LYMPHOCYTES            0.2 (L)           0.5 - 4.6 K/*       ABS. MONOCYTES              0.7               0.1 - 1.3 K/*       ABS. EOSINOPHILS            0.3               0.0 - 0.8 K/*       ABS. BASOPHILS              0.1               0.0 - 0.2 K/*       ABS. IMM. GRANS.            0.0               0.0 - 0.5 K/*       DF                          AUTOMATED                        -METABOLIC PANEL, COMPREHENSIVE:        Result                      Value             Ref Range           Sodium                      135 (L)           136 - 145 mm*       Potassium                   4.5               3.5 - 5.1 mm*       Chloride                    103               98 - 107 mmo*       CO2                         27                21 - 32 mmol*       Anion gap                   5 (L)             7 - 16 mmol/L       Glucose                     125 (H)           65 - 100 mg/*       BUN                         30 (H)            8 - 23 MG/DL        Creatinine                  1.93 (H)          0.6 - 1.0 MG*       GFR est AA                  31 (L)            >60 ml/min/1*       GFR est non-AA              26 (L)            >60 ml/min/1*       Calcium                     8.9               8.3 - 10.4 M*       Bilirubin, total            0.3               0.2 - 1.1 MG*       ALT (SGPT)                  19                12 - 65 U/L         AST (SGOT)                  30                15 - 37 U/L         Alk.  phosphatase            63                50 - 130 U/L        Protein, total              7.6               6.3 - 8.2 g/*       Albumin                     2.7 (L)           3.2 - 4.6 g/*       Globulin                    4.9 (H)           2.3 - 3.5 g/*       A-G Ratio                   0.6 (L)           1.2 - 3.5      -PROCALCITONIN:        Result                      Value             Ref Range           Procalcitonin               <0.05             0.00 - 0.49 *  -EKG, 12 LEAD, INITIAL: Result                      Value             Ref Range           Ventricular Rate            99                BPM                 Atrial Rate                 99                BPM                 P-R Interval                140               ms                  QRS Duration                68                ms                  Q-T Interval                336               ms                  QTC Calculation (Bezet)     431               ms                  Calculated P Axis           0                 degrees             Calculated R Axis           3                 degrees             Calculated T Axis           65                degrees             Diagnosis                                                     Normal sinus rhythm   Possible Anterior infarct , age undetermined   Abnormal ECG   No previous ECGs available     )  Tests in the radiology section of CPT®: ordered and reviewed (XR CHEST PORT    Result Date: 1/21/2022  PORTABLE CHEST, January 21, 2022 at 1556 hours CLINICAL HISTORY:  Cough, shortness of breath, and fever for 2 weeks. COMPARISON:  January 11, 2022 July 2, 2020 FINDINGS: PA erect image demonstrates patchy right basilar infiltrate which is decreased from January 11. Very small right pleural effusion persists. The heart size is within normal limits without evidence of congestive heart failure or pneumothorax. The bony thorax appears intact on this view. There are overlying radiopaque support devices.      INTERVAL IMPROVEMENT IN RIGHT BASILAR PNEUMONIA.    )  Tests in the medicine section of CPT®: reviewed and ordered           EKG    Date/Time: 1/21/2022 4:57 PM  Performed by: Gisell Osei MD  Authorized by: Gisell Osei MD     ECG reviewed by ED Physician in the absence of a cardiologist: yes    Interpretation:     Interpretation: abnormal    Rate:     ECG rate:  99    ECG rate assessment: normal    Rhythm:     Rhythm: sinus rhythm    Ectopy:     Ectopy: none    Conduction: Conduction: normal    ST segments:     ST segments:  Non-specific  T waves:     T waves: non-specific

## 2022-01-22 ENCOUNTER — APPOINTMENT (OUTPATIENT)
Dept: NON INVASIVE DIAGNOSTICS | Age: 87
DRG: 191 | End: 2022-01-22
Attending: INTERNAL MEDICINE
Payer: MEDICARE

## 2022-01-22 LAB
ANION GAP SERPL CALC-SCNC: 5 MMOL/L (ref 7–16)
BUN SERPL-MCNC: 33 MG/DL (ref 8–23)
CALCIUM SERPL-MCNC: 8.9 MG/DL (ref 8.3–10.4)
CHLORIDE SERPL-SCNC: 101 MMOL/L (ref 98–107)
CO2 SERPL-SCNC: 30 MMOL/L (ref 21–32)
CREAT SERPL-MCNC: 1.98 MG/DL (ref 0.6–1)
ECHO AO ROOT DIAM: 2.8 CM
ECHO AO ROOT INDEX: 1.77 CM/M2
ECHO AR MAX VEL PISA: 3.7 M/S
ECHO AV AREA PEAK VELOCITY: 1.5 CM2
ECHO AV AREA VTI: 1.8 CM2
ECHO AV AREA/BSA PEAK VELOCITY: 0.9 CM2/M2
ECHO AV AREA/BSA VTI: 1.1 CM2/M2
ECHO AV MEAN GRADIENT: 12 MMHG
ECHO AV MEAN VELOCITY: 1.6 M/S
ECHO AV PEAK GRADIENT: 21 MMHG
ECHO AV PEAK VELOCITY: 2.3 M/S
ECHO AV REGURGITANT PHT: 198 MS
ECHO AV VELOCITY RATIO: 0.87
ECHO AV VTI: 41.8 CM
ECHO EST RA PRESSURE: 8 MMHG
ECHO IVC EXP: 2 CM
ECHO LA AREA 2C: 16.6 CM2
ECHO LA AREA 4C: 19.7 CM2
ECHO LA DIAMETER INDEX: 1.77 CM/M2
ECHO LA DIAMETER: 2.8 CM
ECHO LA MAJOR AXIS: 5.5 CM
ECHO LA MINOR AXIS: 5.2 CM
ECHO LA TO AORTIC ROOT RATIO: 1
ECHO LA VOL BP: 52 ML (ref 22–52)
ECHO LA VOL/BSA BIPLANE: 33 ML/M2 (ref 16–34)
ECHO LV E' LATERAL VELOCITY: 22 CM/S
ECHO LV E' SEPTAL VELOCITY: 5 CM/S
ECHO LV EDV A2C: 90 ML
ECHO LV EDV A4C: 78 ML
ECHO LV EDV BP: 86 ML (ref 56–104)
ECHO LV EDV INDEX A4C: 49 ML/M2
ECHO LV EDV INDEX BP: 54 ML/M2
ECHO LV EDV NDEX A2C: 57 ML/M2
ECHO LV EJECTION FRACTION A2C: 81 %
ECHO LV EJECTION FRACTION A4C: 83 %
ECHO LV EJECTION FRACTION BIPLANE: 82 % (ref 55–100)
ECHO LV ESV A2C: 17 ML
ECHO LV ESV A4C: 14 ML
ECHO LV ESV INDEX A2C: 11 ML/M2
ECHO LV ESV INDEX A4C: 9 ML/M2
ECHO LV FRACTIONAL SHORTENING: 25 % (ref 28–44)
ECHO LV INTERNAL DIMENSION DIASTOLE INDEX: 2.28 CM/M2
ECHO LV INTERNAL DIMENSION DIASTOLIC: 3.6 CM (ref 3.9–5.3)
ECHO LV INTERNAL DIMENSION SYSTOLIC INDEX: 1.71 CM/M2
ECHO LV INTERNAL DIMENSION SYSTOLIC: 2.7 CM
ECHO LV IVSD: 0.8 CM (ref 0.6–0.9)
ECHO LV MASS 2D: 78.8 G (ref 67–162)
ECHO LV MASS INDEX 2D: 49.9 G/M2 (ref 43–95)
ECHO LV POSTERIOR WALL DIASTOLIC: 0.8 CM (ref 0.6–0.9)
ECHO LV RELATIVE WALL THICKNESS RATIO: 0.44
ECHO LVOT AREA: 1.8 CM2
ECHO LVOT AV VTI INDEX: 1.04
ECHO LVOT DIAM: 1.5 CM
ECHO LVOT MEAN GRADIENT: 9 MMHG
ECHO LVOT PEAK GRADIENT: 16 MMHG
ECHO LVOT PEAK VELOCITY: 2 M/S
ECHO LVOT STROKE VOLUME INDEX: 48.4 ML/M2
ECHO LVOT SV: 76.5 ML
ECHO LVOT VTI: 43.3 CM
ECHO MV A VELOCITY: 2.21 M/S
ECHO MV AREA VTI: 2 CM2
ECHO MV E DECELERATION TIME (DT): 264 MS
ECHO MV E VELOCITY: 1.29 M/S
ECHO MV E/A RATIO: 0.58
ECHO MV E/E' LATERAL: 5.86
ECHO MV E/E' RATIO (AVERAGED): 15.83
ECHO MV E/E' SEPTAL: 25.8
ECHO MV LVOT VTI INDEX: 0.89
ECHO MV MAX VELOCITY: 2.2 M/S
ECHO MV MEAN GRADIENT: 8 MMHG
ECHO MV MEAN VELOCITY: 1.3 M/S
ECHO MV PEAK GRADIENT: 20 MMHG
ECHO MV VTI: 38.7 CM
ECHO PV MAX VELOCITY: 1 M/S
ECHO PV PEAK GRADIENT: 4 MMHG
ECHO RIGHT VENTRICULAR SYSTOLIC PRESSURE (RVSP): 56 MMHG
ECHO RV BASAL DIMENSION: 3.8 CM
ECHO RV TAPSE: 2.2 CM (ref 1.5–2)
ECHO TV REGURGITANT MAX VELOCITY: 3.45 M/S
ECHO TV REGURGITANT PEAK GRADIENT: 48 MMHG
ERYTHROCYTE [DISTWIDTH] IN BLOOD BY AUTOMATED COUNT: 12.8 % (ref 11.9–14.6)
FERRITIN SERPL-MCNC: 127 NG/ML (ref 8–388)
FOLATE SERPL-MCNC: 69.8 NG/ML (ref 3.1–17.5)
GLUCOSE SERPL-MCNC: 150 MG/DL (ref 65–100)
HCT VFR BLD AUTO: 28.8 % (ref 35.8–46.3)
HGB BLD-MCNC: 9.4 G/DL (ref 11.7–15.4)
IRON SATN MFR SERPL: 8 %
IRON SERPL-MCNC: 19 UG/DL (ref 35–150)
MCH RBC QN AUTO: 29 PG (ref 26.1–32.9)
MCHC RBC AUTO-ENTMCNC: 32.6 G/DL (ref 31.4–35)
MCV RBC AUTO: 88.9 FL (ref 79.6–97.8)
NRBC # BLD: 0 K/UL (ref 0–0.2)
PLATELET # BLD AUTO: 201 K/UL (ref 150–450)
PMV BLD AUTO: 8.1 FL (ref 9.4–12.3)
POTASSIUM SERPL-SCNC: 3.8 MMOL/L (ref 3.5–5.1)
RBC # BLD AUTO: 3.24 M/UL (ref 4.05–5.2)
SODIUM SERPL-SCNC: 136 MMOL/L (ref 136–145)
TIBC SERPL-MCNC: 238 UG/DL (ref 250–450)
VIT B12 SERPL-MCNC: 1692 PG/ML (ref 193–986)
WBC # BLD AUTO: 2.9 K/UL (ref 4.3–11.1)

## 2022-01-22 PROCEDURE — 74011000250 HC RX REV CODE- 250: Performed by: INTERNAL MEDICINE

## 2022-01-22 PROCEDURE — C8929 TTE W OR WO FOL WCON,DOPPLER: HCPCS

## 2022-01-22 PROCEDURE — 80048 BASIC METABOLIC PNL TOTAL CA: CPT

## 2022-01-22 PROCEDURE — 74011000258 HC RX REV CODE- 258: Performed by: INTERNAL MEDICINE

## 2022-01-22 PROCEDURE — 94640 AIRWAY INHALATION TREATMENT: CPT

## 2022-01-22 PROCEDURE — 94664 DEMO&/EVAL PT USE INHALER: CPT

## 2022-01-22 PROCEDURE — 82607 VITAMIN B-12: CPT

## 2022-01-22 PROCEDURE — 74011250637 HC RX REV CODE- 250/637: Performed by: INTERNAL MEDICINE

## 2022-01-22 PROCEDURE — 82746 ASSAY OF FOLIC ACID SERUM: CPT

## 2022-01-22 PROCEDURE — 94760 N-INVAS EAR/PLS OXIMETRY 1: CPT

## 2022-01-22 PROCEDURE — 77010033678 HC OXYGEN DAILY

## 2022-01-22 PROCEDURE — 65270000029 HC RM PRIVATE

## 2022-01-22 PROCEDURE — 36415 COLL VENOUS BLD VENIPUNCTURE: CPT

## 2022-01-22 PROCEDURE — 85027 COMPLETE CBC AUTOMATED: CPT

## 2022-01-22 PROCEDURE — 82728 ASSAY OF FERRITIN: CPT

## 2022-01-22 PROCEDURE — 74011250636 HC RX REV CODE- 250/636: Performed by: INTERNAL MEDICINE

## 2022-01-22 PROCEDURE — 83540 ASSAY OF IRON: CPT

## 2022-01-22 PROCEDURE — 74011000250 HC RX REV CODE- 250: Performed by: EMERGENCY MEDICINE

## 2022-01-22 RX ORDER — PREDNISONE 20 MG/1
40 TABLET ORAL
Status: DISCONTINUED | OUTPATIENT
Start: 2022-01-23 | End: 2022-01-24 | Stop reason: HOSPADM

## 2022-01-22 RX ADMIN — METHYLPREDNISOLONE SODIUM SUCCINATE 40 MG: 40 INJECTION, POWDER, FOR SOLUTION INTRAMUSCULAR; INTRAVENOUS at 09:00

## 2022-01-22 RX ADMIN — SODIUM CHLORIDE, PRESERVATIVE FREE 10 ML: 5 INJECTION INTRAVENOUS at 22:00

## 2022-01-22 RX ADMIN — HEPARIN SODIUM 5000 UNITS: 5000 INJECTION INTRAVENOUS; SUBCUTANEOUS at 14:41

## 2022-01-22 RX ADMIN — IPRATROPIUM BROMIDE AND ALBUTEROL SULFATE 3 ML: .5; 3 SOLUTION RESPIRATORY (INHALATION) at 15:00

## 2022-01-22 RX ADMIN — BUDESONIDE AND FORMOTEROL FUMARATE DIHYDRATE 2 PUFF: 80; 4.5 AEROSOL RESPIRATORY (INHALATION) at 08:40

## 2022-01-22 RX ADMIN — SODIUM CHLORIDE, PRESERVATIVE FREE 10 ML: 5 INJECTION INTRAVENOUS at 05:20

## 2022-01-22 RX ADMIN — HEPARIN SODIUM 5000 UNITS: 5000 INJECTION INTRAVENOUS; SUBCUTANEOUS at 21:48

## 2022-01-22 RX ADMIN — SODIUM CHLORIDE, PRESERVATIVE FREE 10 ML: 5 INJECTION INTRAVENOUS at 14:00

## 2022-01-22 RX ADMIN — HEPARIN SODIUM 5000 UNITS: 5000 INJECTION INTRAVENOUS; SUBCUTANEOUS at 05:35

## 2022-01-22 RX ADMIN — METHYLPREDNISOLONE SODIUM SUCCINATE 40 MG: 40 INJECTION, POWDER, FOR SOLUTION INTRAMUSCULAR; INTRAVENOUS at 21:48

## 2022-01-22 RX ADMIN — PIPERACILLIN AND TAZOBACTAM 3.38 G: 3; .375 INJECTION, POWDER, LYOPHILIZED, FOR SOLUTION INTRAVENOUS at 09:05

## 2022-01-22 RX ADMIN — BUDESONIDE AND FORMOTEROL FUMARATE DIHYDRATE 2 PUFF: 80; 4.5 AEROSOL RESPIRATORY (INHALATION) at 20:22

## 2022-01-22 RX ADMIN — PERFLUTREN 3 ML: 6.52 INJECTION, SUSPENSION INTRAVENOUS at 10:15

## 2022-01-22 RX ADMIN — IPRATROPIUM BROMIDE AND ALBUTEROL SULFATE 3 ML: .5; 3 SOLUTION RESPIRATORY (INHALATION) at 20:21

## 2022-01-22 RX ADMIN — PIPERACILLIN AND TAZOBACTAM 3.38 G: 3; .375 INJECTION, POWDER, LYOPHILIZED, FOR SOLUTION INTRAVENOUS at 21:48

## 2022-01-22 RX ADMIN — IPRATROPIUM BROMIDE AND ALBUTEROL SULFATE 3 ML: .5; 3 SOLUTION RESPIRATORY (INHALATION) at 08:40

## 2022-01-22 NOTE — PROGRESS NOTES
SW met with patient who confirmed demographic information, states that she lives alone with her yorkie and has a niece and sister who live locally. Patient's  and daughter both passed away so her niece Lukas Wylie ([phone]) provides the majority of her support and care. Patient is seen by Dr. Tristan Gilliam for primary care and is current. Patient does not use assistive devices to ambulate but has a walker and wheelchair at home from a foot surgery. Patient denies any falls and states that she's independent in her ADLs. Patient advises that she's been ambulating independently in her room to and from the bathroom and denies any feelings of weakness or unbalance. Patient plans to return home at discharge with family assistance.      Vishnu Lackey LMSW    [facility]    * [email or web address]

## 2022-01-22 NOTE — PROGRESS NOTES
Hospitalist Progress Note   Admit Date:  2022  5:12 PM   Name:  Rob Licona   Age:  80 y.o. Sex:  female  :  1931   MRN:  [id number]   Room:  [facility], later [facility]/    Presenting Complaint: Shortness of Breath and Pneumonia    Reason(s) for Admission: Community acquired pneumonia [J18.9]  Lower extremity edema [R60.0]     Hospital Course & Interval History:   Please refer to the admission H&P for details of presentation. In summary, Rob Licona is a 80 y.o. female with medical history significant for  COPD, hypothyroidism, hyperlipidemia, who presented with low-grade fevers, cough with white productive sputum, lower extremity swelling and shortness of breath. Patient was diagnosed with pneumonia on  by her pulmonologist and was given Augmentin with some improvements but has not resolved. Also having LE swelling and has done lower extremity arterial duplex which showed moderate stenosis of bilateral common femoral, deep femoral and superficial femoral arteries have moderate stenosis. She is pending vascular surgery follow-up appointment. Subjective/24 hr Events (22) : Patient is seen and examined at bedside. No acute events reported overnight by nursing staff. On room air without any distress. Intermittent coughing with white sputum. Patient denies fever, chills, chest pains, shortness of breath, n/v, abdominal pain. Review of Systems: 10 point review of systems is otherwise negative with the exception of the elements mentioned above. Assessment & Plan:      RLL Pneumonia  Likely failed outpt therapy with PO abx with improvement of PNA on CXR but procalc is < 0.05.   Rapid COVID is neg  - on zosyn ( - )    Dyspnea  Multifactorial. Possible due to overload with elevated proBNP, right lower lobe pneumonia, COPD.   - O2 supplement as needed  - will require walk test prior to dc     LE swelling  lower extremity arterial duplex which showed moderate stenosis of bilateral common femoral, deep femoral and superficial femoral arteries have moderate stenosis. pBNP of 1378  01/22/22: LE Dopplers reviewed. No DVTs  Echo with EF 65-70% but indeterminate diastolic dysfunction  - pending outpatient Vascular follow up  - lasix 40mg daily. Hold today due to slight bump in Cr.  - I&O     COPD , likely in acute exacerbation  Bronchiectasis without complications  Wheezing on exam, patient is status post Solu-Medrol 125mg in the ED.  - on solumedrol IV BID for today. Change to prednisone 40mg tomorrow  - nebs + flutter valve     Anemia  Hb of 9.4 today.  with prior Hb of 12-11  - check iron panel and fobt     CKD stage4  Cr of 1.93 on admission with baseline 1.78-1.5  - hold losartan-hctz  - monitor closely   - hold lasix as today's Cr is 1.99     Hypothyriodism: on synthroid  HTN  - continue norvasc  - hold losartan-hctz  - start lasix as above     Diet:  ADULT DIET Regular; 1800 ml  DVT PPx: heparin sq  Code status: Full Code    Hospital Problems as of 1/22/2022 Date Reviewed: 12/30/2021          Codes Class Noted - Resolved POA    Lower extremity edema ICD-10-CM: R60.0  ICD-9-CM: 782.3  1/21/2022 - Present Yes        * (Principal) Community acquired pneumonia ICD-10-CM: J18.9  ICD-9-CM: 892  1/21/2022 - Present Yes        COPD exacerbation ([facility] ICD-10-CM: J44.1  ICD-9-CM: 491.21  1/21/2022 - Present Yes        Anemia ICD-10-CM: D64.9  ICD-9-CM: 285.9  1/21/2022 - Present Yes        Bronchiectasis without complication ([facility] YAB-84-YJ: J47.9  ICD-9-CM: 494.0  3/27/2019 - Present Yes        Hypothyroidism due to acquired atrophy of thyroid ICD-10-CM: E03.4  ICD-9-CM: 244.8, 246.8  10/21/2015 - Present Yes        COPD (chronic obstructive pulmonary disease) ([facility] ICD-10-CM: J44.9  ICD-9-CM: 952  Unknown - Present Yes        Hyperlipidemia ICD-10-CM: E78.5  ICD-9-CM: 272.4  Unknown - Present Yes              Objective:     Patient Vitals for the past 24 hrs:   Temp Pulse Resp BP SpO2 01/22/22 1227 (P) 98.3 °F (36.8 °C) (P) 99 (P) 18 (!) (P) 139/54 (P) 99 %   01/22/22 0840     97 %   01/22/22 0838     98 %   01/22/22 0830     100 %   01/22/22 0818 97.8 °F (36.6 °C) 95 18 111/60 97 %   01/22/22 0238 97.7 °F (36.5 °C) 100 18 (!) 132/52 99 %   01/21/22 2320 97.3 °F (36.3 °C) 98 19 (!) 179/50 92 %   01/21/22 2005 97.7 °F (36.5 °C) 100 20 (!) 191/58 100 %   01/21/22 1930  93 21 (!) 157/69 99 %   01/21/22 1919  (!) 108 (!) 35 (!) 197/84 95 %   01/21/22 1752     100 %   01/21/22 1730  99 (!) 42 (!) 191/77 96 %   01/21/22 1717    (!) 206/84 96 %   01/21/22 1526 99.2 °F (37.3 °C) (!) 102 16 (!) 142/54 91 %     Oxygen Therapy  O2 Sat (%): (P) 99 % (01/22/22 1227)  Pulse via Oximetry: 103 beats per minute (01/22/22 0840)  O2 Device: None (Room air) (01/22/22 0840)  O2 Flow Rate (L/min): 0 l/min (01/22/22 0838)    Estimated body mass index is 19.47 kg/m² as calculated from the following:    Height as of this encounter: 5' 5\" (1.651 m). Weight as of this encounter: 53.1 kg (117 lb). Intake/Output Summary (Last 24 hours) at 1/22/2022 1255  Last data filed at 1/22/2022 0908  Gross per 24 hour   Intake 487 ml   Output 300 ml   Net 187 ml         Physical Exam:     Blood pressure (!) (P) 139/54, pulse (P) 99, temperature (P) 98.3 °F (36.8 °C), resp. rate (P) 18, height 5' 5\" (1.651 m), weight 53.1 kg (117 lb), SpO2 (P) 99 %, not currently breastfeeding. General:          Well nourished. No overt distress  Head:               Normocephalic, atraumatic  Eyes:               Sclerae appear normal.  Pupils equally round. ENT:                Nares appear normal, no drainage. Moist oral mucosa  Neck:               No restricted ROM. Trachea midline   CV:                  RRR. No jugular venous distension. Lungs:             Scattered crackles, +wheezing (improved)  Abdomen: Bowel sounds present. Soft, nontender, nondistended. Extremities:     No cyanosis or clubbing.   2+ edema  Skin:                No rashes and normal coloration. Warm and dry. Neuro:             CN II-XII grossly intact. A&Ox3  Psych:             Normal mood and affect. I have reviewed ordered lab tests and independently visualized imaging below:    Recent Labs:  Recent Results (from the past 48 hour(s))   COVID-19 RAPID TEST    Collection Time: 01/21/22  3:32 PM   Result Value Ref Range    Specimen source NP Swab      COVID-19 rapid test Not detected NOTD     CULTURE, BLOOD    Collection Time: 01/21/22  4:05 PM    Specimen: Blood   Result Value Ref Range    Special Requests: LEFT  Antecubital        Culture result: NO GROWTH AFTER 15 HOURS     EKG, 12 LEAD, INITIAL    Collection Time: 01/21/22  4:07 PM   Result Value Ref Range    Ventricular Rate 99 BPM    Atrial Rate 99 BPM    P-R Interval 140 ms    QRS Duration 68 ms    Q-T Interval 336 ms    QTC Calculation (Bezet) 431 ms    Calculated P Axis 0 degrees    Calculated R Axis 3 degrees    Calculated T Axis 65 degrees    Diagnosis       Normal sinus rhythm  Possible Anterior infarct , age undetermined  Abnormal ECG  No previous ECGs available  Confirmed by [facility]  MD ()TREVOR ([id number]) on 1/21/2022 9:07:47 PM     LACTIC ACID    Collection Time: 01/21/22  4:11 PM   Result Value Ref Range    Lactic acid 0.7 0.4 - 2.0 MMOL/L   CBC WITH AUTOMATED DIFF    Collection Time: 01/21/22  4:11 PM   Result Value Ref Range    WBC 8.0 4.3 - 11.1 K/uL    RBC 3.35 (L) 4.05 - 5.2 M/uL    HGB 9.7 (L) 11.7 - 15.4 g/dL    HCT 30.2 (L) 35.8 - 46.3 %    MCV 90.1 79.6 - 97.8 FL    MCH 29.0 26.1 - 32.9 PG    MCHC 32.1 31.4 - 35.0 g/dL    RDW 13.0 11.9 - 14.6 %    PLATELET 469 701 - 245 K/uL    MPV 8.0 (L) 9.4 - 12.3 FL    ABSOLUTE NRBC 0.00 0.0 - 0.2 K/uL    NEUTROPHILS 82 (H) 43 - 78 %    LYMPHOCYTES 3 (L) 13 - 44 %    MONOCYTES 9 4.0 - 12.0 %    EOSINOPHILS 4 0.5 - 7.8 %    BASOPHILS 1 0.0 - 2.0 %    IMMATURE GRANULOCYTES 1 0.0 - 5.0 %    ABS.  NEUTROPHILS 6.6 1.7 - 8.2 K/UL    ABS. LYMPHOCYTES 0.2 (L) 0.5 - 4.6 K/UL    ABS. MONOCYTES 0.7 0.1 - 1.3 K/UL    ABS. EOSINOPHILS 0.3 0.0 - 0.8 K/UL    ABS. BASOPHILS 0.1 0.0 - 0.2 K/UL    ABS. IMM. GRANS. 0.0 0.0 - 0.5 K/UL    DF AUTOMATED     METABOLIC PANEL, COMPREHENSIVE    Collection Time: 01/21/22  4:11 PM   Result Value Ref Range    Sodium 135 (L) 136 - 145 mmol/L    Potassium 4.5 3.5 - 5.1 mmol/L    Chloride 103 98 - 107 mmol/L    CO2 27 21 - 32 mmol/L    Anion gap 5 (L) 7 - 16 mmol/L    Glucose 125 (H) 65 - 100 mg/dL    BUN 30 (H) 8 - 23 MG/DL    Creatinine 1.93 (H) 0.6 - 1.0 MG/DL    GFR est AA 31 (L) >60 ml/min/1.73m2    GFR est non-AA 26 (L) >60 ml/min/1.73m2    Calcium 8.9 8.3 - 10.4 MG/DL    Bilirubin, total 0.3 0.2 - 1.1 MG/DL    ALT (SGPT) 19 12 - 65 U/L    AST (SGOT) 30 15 - 37 U/L    Alk.  phosphatase 63 50 - 130 U/L    Protein, total 7.6 6.3 - 8.2 g/dL    Albumin 2.7 (L) 3.2 - 4.6 g/dL    Globulin 4.9 (H) 2.3 - 3.5 g/dL    A-G Ratio 0.6 (L) 1.2 - 3.5     PROCALCITONIN    Collection Time: 01/21/22  4:11 PM   Result Value Ref Range    Procalcitonin <0.05 0.00 - 0.49 ng/mL   NT-PRO BNP    Collection Time: 01/21/22  4:17 PM   Result Value Ref Range    NT pro-BNP 1,378 (H) <450 PG/ML   CULTURE, BLOOD    Collection Time: 01/21/22  5:26 PM    Specimen: Blood   Result Value Ref Range    Special Requests: NO SPECIAL REQUESTS  RIGHT  Antecubital        Culture result: NO GROWTH AFTER 14 HOURS     METABOLIC PANEL, BASIC    Collection Time: 01/22/22  5:32 AM   Result Value Ref Range    Sodium 136 136 - 145 mmol/L    Potassium 3.8 3.5 - 5.1 mmol/L    Chloride 101 98 - 107 mmol/L    CO2 30 21 - 32 mmol/L    Anion gap 5 (L) 7 - 16 mmol/L    Glucose 150 (H) 65 - 100 mg/dL    BUN 33 (H) 8 - 23 MG/DL    Creatinine 1.98 (H) 0.6 - 1.0 MG/DL    GFR est AA 30 (L) >60 ml/min/1.73m2    GFR est non-AA 25 (L) >60 ml/min/1.73m2    Calcium 8.9 8.3 - 10.4 MG/DL   CBC W/O DIFF    Collection Time: 01/22/22  5:32 AM   Result Value Ref Range    WBC 2.9 (L) 4.3 - 11.1 K/uL    RBC 3.24 (L) 4.05 - 5.2 M/uL    HGB 9.4 (L) 11.7 - 15.4 g/dL    HCT 28.8 (L) 35.8 - 46.3 %    MCV 88.9 79.6 - 97.8 FL    MCH 29.0 26.1 - 32.9 PG    MCHC 32.6 31.4 - 35.0 g/dL    RDW 12.8 11.9 - 14.6 %    PLATELET 207 417 - 469 K/uL    MPV 8.1 (L) 9.4 - 12.3 FL    ABSOLUTE NRBC 0.00 0.0 - 0.2 K/uL   ECHO ADULT COMPLETE    Collection Time: 01/22/22 10:26 AM   Result Value Ref Range    LV EDV A2C 90 mL    LV EDV A4C 78 mL    LV EDV BP 86 56 - 104 mL    LV ESV A2C 17 mL    LV ESV A4C 14 mL    IVSd 0.8 0.6 - 0.9 cm    LVIDd 3.6 (A) 3.9 - 5.3 cm    LVIDs 2.7 cm    LVOT Diameter 1.5 cm    LVOT Mean Gradient 9 mmHg    LVOT VTI 43.3 cm    LVOT Peak Velocity 2.0 m/s    LVOT Peak Gradient 16 mmHg    LVPWd 0.8 0.6 - 0.9 cm    LV E' Lateral Velocity 22 cm/s    LV E' Septal Velocity 5 cm/s    LV Ejection Fraction A2C 81 %    LV Ejection Fraction A4C 83 %    EF BP 82 55 - 100 %    LVOT Area 1.8 cm2    LVOT SV 76.5 ml    LA Minor Axis 5.2 cm    LA Major [place] 5.5 cm    LA Area 2C 16.6 cm2    LA Area 4C 19.7 cm2    LA Volume BP 52 22 - 52 mL    LA Diameter 2.8 cm    AR .0 ms    AR Max Velocity PISA 3.7 m/s    AV Peak Velocity 2.3 m/s    AV Peak Gradient 21 mmHg    AV Mean Gradient 12 mmHg    AV VTI 41.8 cm    AV Mean Velocity 1.6 m/s    AV Area by VTI 1.8 cm2    AV Area by Peak Velocity 1.5 cm2    Aortic Root 2.8 cm    IVC Expiration 2.0 cm    MV E Wave Deceleration Time 264.0 ms    MV A Velocity 2.21 m/s    MV E Velocity 1.29 m/s    MV Mean Gradient 8 mmHg    MV VTI 38.7 cm    MV Mean Velocity 1.3 m/s    MV Max Velocity 2.2 m/s    MV Peak Gradient 20 mmHg    MV Area by VTI 2.0 cm2    PV Max Velocity 1.0 m/s    PV Peak Gradient 4 mmHg    Est. RA Pressure 8 mmHg    RV Basal Dimension 3.8 cm    TAPSE 2.2 (A) 1.5 - 2.0 cm    TR Max Velocity 3.45 m/s    TR Peak Gradient 48 mmHg    Fractional Shortening 2D 25 28 - 44 %    LV EDV Index BP 54 mL/m2    LV ESV Index A4C 9 mL/m2    LV EDV Index A4C 49 mL/m2    LV ESV Index A2C 11 mL/m2    LV EDV Index A2C 57 mL/m2    LVIDd Index 2.28 cm/m2    LVIDs Index 1.71 cm/m2    LV RWT Ratio 0.44     LV Mass 2D 78.8 67 - 162 g    LV Mass 2D Index 49.9 43 - 95 g/m2    MV E/A 0.58     E/E' Ratio (Averaged) 15.83     E/E' Lateral 5.86     E/E' Septal 25.80     LA Volume Index BP 33 16 - 34 ml/m2    LVOT Stroke Volume Index 48.4 mL/m2    LA Size Index 1.77 cm/m2    LA/AO Root Ratio 1.00     Ao Root Index 1.77 cm/m2    AV Velocity Ratio 0.87     LVOT:AV VTI Index 1.04     KEHINDE/BSA VTI 1.1 cm2/m2    KEHINDE/BSA Peak Velocity 0.9 cm2/m2    MV:LVOT VTI Index 0.89     RVSP 56 mmHg       All Micro Results     Procedure Component Value Units Date/Time    BLOOD CULTURE [[id number]] Collected: 01/21/22 1605    Order Status: Completed Specimen: Blood Updated: 01/22/22 0752     Special Requests: --        LEFT  Antecubital       Culture result: NO GROWTH AFTER 15 HOURS       BLOOD CULTURE [[id number]] Collected: 01/21/22 1726    Order Status: Completed Specimen: Blood Updated: 01/22/22 0752     Special Requests: --        NO SPECIAL REQUESTS  RIGHT  Antecubital       Culture result: NO GROWTH AFTER 14 HOURS       COVID-19 RAPID TEST [[id number]] Collected: 01/21/22 1532    Order Status: Completed Specimen: Nasopharyngeal Updated: 01/21/22 1556     Specimen source NP Swab        COVID-19 rapid test Not detected        Comment:      The specimen is NEGATIVE for SARS-CoV-2, the novel coronavirus associated with COVID-19. A negative result does not rule out COVID-19. This test has been authorized by the FDA under an Emergency Use Authorization (EUA) for use by authorized laboratories.         Fact sheet for Healthcare Providers: iTendency.uy  Fact sheet for Patients: iTendency.uy       Methodology: Isothermal Nucleic Acid Amplification               Other Studies:  XR CHEST PORT    Result Date: 1/21/2022  PORTABLE CHEST, January 21, 2022 at 1556 hours CLINICAL HISTORY:  Cough, shortness of breath, and fever for 2 weeks. COMPARISON:  January 11, 2022 July 2, 2020 FINDINGS: PA erect image demonstrates patchy right basilar infiltrate which is decreased from January 11. Very small right pleural effusion persists. The heart size is within normal limits without evidence of congestive heart failure or pneumothorax. The bony thorax appears intact on this view. There are overlying radiopaque support devices. INTERVAL IMPROVEMENT IN RIGHT BASILAR PNEUMONIA. DUPLEX LOWER EXT VENOUS BILAT    Result Date: 1/22/2022  EXAM: DUPLEX LOWER EXT VENOUS BILAT HISTORY: LE swelling. TECHNIQUE: Sonographic evaluation of the bilateral lower extremities was performed utilizing 2-D grayscale, color flow Doppler imaging, and spectral waveform analysis. COMPARISON: None. FINDINGS: Bilateral common femoral, femoral, popliteal, and greater saphenous veins demonstrate normal compressibility and Doppler waveforms. The saphenofemoral junction is unremarkable. Bilateral posterior tibial and peroneal veins are without evidence of thrombosis. No evidence of deep venous thrombosis in the bilateral lower extremities. ECHO ADULT COMPLETE    Result Date: 1/22/2022    Left Ventricle: Left ventricle size is normal. Normal wall thickness. Normal wall motion. Hyperdynamic left ventricular systolic function with a visually estimated EF of 65 - 70%.   Aortic Valve: Mild to moderate transvalvular regurgitation.   Left Atrium: Left atrium is mildly dilated.   Technical qualifiers: Echo study was technically difficult due to low parasternal window.   Contrast used: Definity. Technically very difficult study with poor acoustic window, multiple off axis views, poor visualization of all valves and hyperdynamic LV throughout study. There is a significant LVOT gradient with valsalva. Consider additional imaging if clinically indicated.        Current Meds:  Current Facility-Administered Medications   Medication Dose Route Frequency    [START ON 1/23/2022] predniSONE (DELTASONE) tablet 40 mg  40 mg Oral DAILY WITH BREAKFAST    sodium chloride (NS) flush 5-10 mL  5-10 mL IntraVENous Q8H    sodium chloride (NS) flush 5-10 mL  5-10 mL IntraVENous PRN    sodium chloride (NS) flush 5-40 mL  5-40 mL IntraVENous Q8H    sodium chloride (NS) flush 5-40 mL  5-40 mL IntraVENous PRN    acetaminophen (TYLENOL) tablet 650 mg  650 mg Oral Q6H PRN    Or    acetaminophen (TYLENOL) suppository 650 mg  650 mg Rectal Q6H PRN    polyethylene glycol (MIRALAX) packet 17 g  17 g Oral DAILY PRN    promethazine (PHENERGAN) tablet 12.5 mg  12.5 mg Oral Q6H PRN    Or    ondansetron (ZOFRAN) injection 4 mg  4 mg IntraVENous Q6H PRN    heparin (porcine) injection 5,000 Units  5,000 Units SubCUTAneous Q8H    albuterol (PROVENTIL VENTOLIN) nebulizer solution 2.5 mg  2.5 mg Nebulization Q6H PRN    budesonide-formoterol (SYMBICORT) 80-4.5 mcg inhaler  2 Puff Inhalation BID RT    albuterol-ipratropium (DUO-NEB) 2.5 MG-0.5 MG/3 ML  3 mL Nebulization Q6HWA RT    piperacillin-tazobactam (ZOSYN) 3.375 g in 0.9% sodium chloride (MBP/ADV) 100 mL MBP  3.375 g IntraVENous Q12H    [Held by provider] furosemide (LASIX) injection 40 mg  40 mg IntraVENous DAILY    methylPREDNISolone (PF) (SOLU-MEDROL) injection 40 mg  40 mg IntraVENous Q12H       Signed:  Jesica Lara MD    Part of this note may have been written by using a voice dictation software. The note has been proof read but may still contain some grammatical/other typographical errors.

## 2022-01-22 NOTE — PROGRESS NOTES
01/21/22 2005   Dual Skin Pressure Injury Assessment   Dual Skin Pressure Injury Assessment WDL     Dual skin assessment completed by this RN and Debbi Hopper RN. Pt has no breakdown noted. Pt does have red discoloration and edema noted to BLE. No other skin issues noted. Will continue with POC.

## 2022-01-22 NOTE — ACP (ADVANCE CARE PLANNING)
Advance Care Planning     Advance Care Planning Activator (Inpatient)  Conversation Note      Date of ACP Conversation: 01/22/22     Conversation Conducted with:   Patient with Decision Making Capacity    ACP Activator: Yehuda Decision Maker:    Current Designated Health Care Decision Maker:     Click here to complete [address] including selection of the Healthcare Decision Maker Relationship (ie \"Primary\")  Today we documented desired Decision Maker(s), who is (are) NOT Legal Next of Sohailcargabrielva Shwetha. ACP documents are required for decision maker authority. Care Preferences    Ventilation: \"If you were in your present state of health and suddenly became very ill and were unable to breathe on your own, what would your preference be about the use of a ventilator (breathing machine) if it were available to you? \"      If patient would desire the use of a ventilator (breathing machine), answer WOULD PREFER TO LEAVE TO THE DISCRETION OF HER NIECE BARBIE WHO SHE STATES KNOWS HER WISHES    \"If your health worsens and it becomes clear that your chance of recovery is unlikely, what would your preference be about the use of a ventilator (breathing machine) if it were available to you? \"     Would the patient desire the use of a ventilator (breathing machine)? WOULD PREFER TO LEAVE TO THE DISCRETION OF HER NIECE BARBIE WHO SHE STATES KNOWS HER WISHES      Resuscitation  \"CPR works best to restart the heart when there is a sudden event, like a heart attack, in someone who is otherwise healthy. Unfortunately, CPR does not typically restart the heart for people who have serious health conditions or who are very sick. \"    \"In the event your heart stopped as a result of an underlying serious health condition, would you want attempts to be made to restart your heart WOULD PREFER TO LEAVE TO THE DISCRETION OF HER NIECE BARBEI WHO SHE STATES KNOWS HER WISHES      [x] Yes  [] No   Educated Patient / Johanne List regarding differences between Advance Directives and portable DNR orders.     Length of ACP Conversation in minutes:  15    Conversation Outcomes:  [x] ACP discussion completed  [] Existing advance directive reviewed with patient; no changes to patient's previously recorded wishes     [] New Advance Directive completed   [] Portable Do Not Resuscitate prepared for Provider review and signature  [] POLST/POST/MOLST/MOST prepared for Provider review and signature      Follow-up plan:    [] Schedule follow-up conversation to continue planning  [x] Referred individual to Provider for additional questions/concerns   [] Advised patient/agent/surrogate to review completed ACP document and update if needed with changes in condition, patient preferences or care setting     [] This note routed to one or more involved healthcare providers

## 2022-01-22 NOTE — PROGRESS NOTES
TRANSFER - IN REPORT:    Verbal report received from [facility] (name) on Haydee Angulo  being received from ED (unit) for routine progression of care      Report consisted of patients Situation, Background, Assessment and   Recommendations(SBAR). Information from the following report(s) SBAR, Kardex, ED Summary, Intake/Output, MAR and Recent Results was reviewed with the receiving nurse. Opportunity for questions and clarification was provided. Assessment completed upon patients arrival to unit and care assumed.

## 2022-01-22 NOTE — PROGRESS NOTES
END OF SHIFT NOTE:    Intake/Output  01/22 0701 - 01/22 1900  In: 220 [P.O.:220]  Out: -    Voiding: YES  Catheter: NO  Drain:              Stool:  0 occurrences. Emesis:  0 occurrences.           VITAL SIGNS  Patient Vitals for the past 12 hrs:   Temp Pulse Resp BP SpO2   01/22/22 1508     92 %   01/22/22 1500 98.2 °F (36.8 °C) 92 18 (!) 146/60 96 %   01/22/22 1227 98.3 °F (36.8 °C) 99 18 (!) 139/54 99 %   01/22/22 0840     97 %   01/22/22 0838     98 %   01/22/22 0830     100 %   01/22/22 0818 97.8 °F (36.6 °C) 95 18 111/60 97 %       Pain Assessment  Pain 1  Pain Scale 1: Numeric (0 - 10) (01/22/22 1423)  Pain Intensity 1: 0 (01/22/22 1423)  Patient Stated Pain Goal: 0 (01/22/22 1423)    Ambulating  Yes    Additional Information:     - afebrile  - pt continues to have cough with white productive sputum  - no complaints of pain  - pt weaned to room air now    Juancarlos Bernal RN

## 2022-01-22 NOTE — ED NOTES
TRANSFER - OUT REPORT:    Verbal report given to Loulou velazquez RN on Farheen Romano  being transferred to Room 343 for routine progression of care       Report consisted of patients Situation, Background, Assessment and   Recommendations(SBAR). Information from the following report(s) SBAR, Kardex, ED Summary, Intake/Output, MAR and Recent Results was reviewed with the receiving nurse. Lines:   Peripheral IV 01/21/22 Left Antecubital (Active)   Site Assessment Clean, dry, & intact 01/21/22 1600   Phlebitis Assessment 0 01/21/22 1600   Infiltration Assessment 0 01/21/22 1600   Dressing Status Clean, dry, & intact 01/21/22 1600   Dressing Type Gauze;Tape;Transparent 01/21/22 1600   Hub Color/Line Status Pink;Flushed 01/21/22 1600   Action Taken Blood drawn 01/21/22 1600       Peripheral IV 01/21/22 Right Antecubital (Active)   Site Assessment Clean, dry, & intact 01/21/22 1727   Phlebitis Assessment 0 01/21/22 1727   Infiltration Assessment 0 01/21/22 1727   Dressing Status Clean, dry, & intact 01/21/22 1727        Opportunity for questions and clarification was provided.       Patient transported with:   O2 @ 2 liters  Registered Nurse

## 2022-01-22 NOTE — PROGRESS NOTES
SW reviewed patient's chart and conducted a baseline assessment. Discharge plan at this time is as follows:     Care Management Interventions  PCP Verified by CM: Yes  Mode of Transport at Discharge: Self  Transition of Care Consult (CM Consult): Discharge Planning  Support Systems: Other Family Member(s)  Confirm Follow Up Transport: Family  Discharge Location  Patient Expects to be Discharged to[de-identified] Home with family assistance      *Please note that discharge plans can change throughout an inpatient admission.  Ensure that you are referring to the most recent social work/nurse case management note for current discharge plan*     Alice Smith, [address] [place] [address] Rd Work   [facility]    * [email or web address] 26-Sep-2019 02:41

## 2022-01-22 NOTE — PROGRESS NOTES
SW went to meet with patient for an ACP conversation and baseline assessment. Patient eating and asked SW to return at a later time. SW agreed.      Meg Appiah LMSW     [facility]    * [email or web address]

## 2022-01-23 ENCOUNTER — APPOINTMENT (OUTPATIENT)
Dept: ULTRASOUND IMAGING | Age: 87
DRG: 191 | End: 2022-01-23
Attending: INTERNAL MEDICINE
Payer: MEDICARE

## 2022-01-23 ENCOUNTER — APPOINTMENT (OUTPATIENT)
Dept: GENERAL RADIOLOGY | Age: 87
DRG: 191 | End: 2022-01-23
Attending: INTERNAL MEDICINE
Payer: MEDICARE

## 2022-01-23 LAB
ANION GAP SERPL CALC-SCNC: 6 MMOL/L (ref 7–16)
APPEARANCE UR: CLEAR
BACTERIA URNS QL MICRO: 0 /HPF
BILIRUB UR QL: NEGATIVE
BUN SERPL-MCNC: 41 MG/DL (ref 8–23)
CALCIUM SERPL-MCNC: 8.6 MG/DL (ref 8.3–10.4)
CASTS URNS QL MICRO: ABNORMAL /LPF
CHLORIDE SERPL-SCNC: 103 MMOL/L (ref 98–107)
CO2 SERPL-SCNC: 28 MMOL/L (ref 21–32)
COLOR UR: YELLOW
CREAT SERPL-MCNC: 2.06 MG/DL (ref 0.6–1)
CREAT UR-MCNC: 70 MG/DL
EPI CELLS #/AREA URNS HPF: ABNORMAL /HPF
ERYTHROCYTE [DISTWIDTH] IN BLOOD BY AUTOMATED COUNT: 12.8 % (ref 11.9–14.6)
GLUCOSE SERPL-MCNC: 144 MG/DL (ref 65–100)
GLUCOSE UR STRIP.AUTO-MCNC: NEGATIVE MG/DL
HCT VFR BLD AUTO: 28.2 % (ref 35.8–46.3)
HGB BLD-MCNC: 9.1 G/DL (ref 11.7–15.4)
HGB UR QL STRIP: NEGATIVE
KETONES UR QL STRIP.AUTO: NEGATIVE MG/DL
LEUKOCYTE ESTERASE UR QL STRIP.AUTO: NEGATIVE
MCH RBC QN AUTO: 28.6 PG (ref 26.1–32.9)
MCHC RBC AUTO-ENTMCNC: 32.3 G/DL (ref 31.4–35)
MCV RBC AUTO: 88.7 FL (ref 79.6–97.8)
NITRITE UR QL STRIP.AUTO: NEGATIVE
NRBC # BLD: 0 K/UL (ref 0–0.2)
PH UR STRIP: 7 [PH] (ref 5–9)
PLATELET # BLD AUTO: 213 K/UL (ref 150–450)
PMV BLD AUTO: 8 FL (ref 9.4–12.3)
POTASSIUM SERPL-SCNC: 3.5 MMOL/L (ref 3.5–5.1)
PROT UR STRIP-MCNC: 30 MG/DL
PROT UR-MCNC: 47 MG/DL
PROT/CREAT UR-RTO: 0.7
RBC # BLD AUTO: 3.18 M/UL (ref 4.05–5.2)
RBC #/AREA URNS HPF: ABNORMAL /HPF
SODIUM SERPL-SCNC: 137 MMOL/L (ref 136–145)
SODIUM UR-SCNC: 84 MMOL/L
SP GR UR REFRACTOMETRY: 1.01 (ref 1–1.02)
TSH SERPL DL<=0.005 MIU/L-ACNC: 0.42 UIU/ML
UROBILINOGEN UR QL STRIP.AUTO: 0.2 EU/DL (ref 0.2–1)
WBC # BLD AUTO: 9.4 K/UL (ref 4.3–11.1)
WBC URNS QL MICRO: ABNORMAL /HPF

## 2022-01-23 PROCEDURE — 74011000250 HC RX REV CODE- 250: Performed by: EMERGENCY MEDICINE

## 2022-01-23 PROCEDURE — 74011636637 HC RX REV CODE- 636/637: Performed by: INTERNAL MEDICINE

## 2022-01-23 PROCEDURE — 74011250637 HC RX REV CODE- 250/637: Performed by: INTERNAL MEDICINE

## 2022-01-23 PROCEDURE — 85027 COMPLETE CBC AUTOMATED: CPT

## 2022-01-23 PROCEDURE — 71045 X-RAY EXAM CHEST 1 VIEW: CPT

## 2022-01-23 PROCEDURE — 84443 ASSAY THYROID STIM HORMONE: CPT

## 2022-01-23 PROCEDURE — 80048 BASIC METABOLIC PNL TOTAL CA: CPT

## 2022-01-23 PROCEDURE — 65270000029 HC RM PRIVATE

## 2022-01-23 PROCEDURE — 94760 N-INVAS EAR/PLS OXIMETRY 1: CPT

## 2022-01-23 PROCEDURE — 74011250636 HC RX REV CODE- 250/636: Performed by: INTERNAL MEDICINE

## 2022-01-23 PROCEDURE — 74011000250 HC RX REV CODE- 250: Performed by: INTERNAL MEDICINE

## 2022-01-23 PROCEDURE — 94640 AIRWAY INHALATION TREATMENT: CPT

## 2022-01-23 PROCEDURE — 36415 COLL VENOUS BLD VENIPUNCTURE: CPT

## 2022-01-23 PROCEDURE — 74011000258 HC RX REV CODE- 258: Performed by: INTERNAL MEDICINE

## 2022-01-23 PROCEDURE — 84300 ASSAY OF URINE SODIUM: CPT

## 2022-01-23 PROCEDURE — 76770 US EXAM ABDO BACK WALL COMP: CPT

## 2022-01-23 PROCEDURE — 84156 ASSAY OF PROTEIN URINE: CPT

## 2022-01-23 PROCEDURE — 81001 URINALYSIS AUTO W/SCOPE: CPT

## 2022-01-23 RX ORDER — LANOLIN ALCOHOL/MO/W.PET/CERES
1 CREAM (GRAM) TOPICAL
Status: DISCONTINUED | OUTPATIENT
Start: 2022-01-24 | End: 2022-01-24 | Stop reason: HOSPADM

## 2022-01-23 RX ADMIN — BUDESONIDE AND FORMOTEROL FUMARATE DIHYDRATE 2 PUFF: 80; 4.5 AEROSOL RESPIRATORY (INHALATION) at 20:00

## 2022-01-23 RX ADMIN — SODIUM CHLORIDE, PRESERVATIVE FREE 10 ML: 5 INJECTION INTRAVENOUS at 13:26

## 2022-01-23 RX ADMIN — SODIUM CHLORIDE 125 MG: 9 INJECTION, SOLUTION INTRAVENOUS at 21:35

## 2022-01-23 RX ADMIN — SODIUM CHLORIDE, PRESERVATIVE FREE 10 ML: 5 INJECTION INTRAVENOUS at 21:26

## 2022-01-23 RX ADMIN — PIPERACILLIN AND TAZOBACTAM 3.38 G: 3; .375 INJECTION, POWDER, LYOPHILIZED, FOR SOLUTION INTRAVENOUS at 09:22

## 2022-01-23 RX ADMIN — PREDNISONE 40 MG: 20 TABLET ORAL at 09:22

## 2022-01-23 RX ADMIN — BUDESONIDE AND FORMOTEROL FUMARATE DIHYDRATE 2 PUFF: 80; 4.5 AEROSOL RESPIRATORY (INHALATION) at 09:04

## 2022-01-23 RX ADMIN — IPRATROPIUM BROMIDE AND ALBUTEROL SULFATE 3 ML: .5; 3 SOLUTION RESPIRATORY (INHALATION) at 20:00

## 2022-01-23 RX ADMIN — SODIUM CHLORIDE 25 MG: 9 INJECTION, SOLUTION INTRAVENOUS at 13:32

## 2022-01-23 RX ADMIN — HEPARIN SODIUM 5000 UNITS: 5000 INJECTION INTRAVENOUS; SUBCUTANEOUS at 13:29

## 2022-01-23 RX ADMIN — PIPERACILLIN AND TAZOBACTAM 3.38 G: 3; .375 INJECTION, POWDER, LYOPHILIZED, FOR SOLUTION INTRAVENOUS at 20:26

## 2022-01-23 RX ADMIN — IPRATROPIUM BROMIDE AND ALBUTEROL SULFATE 3 ML: .5; 3 SOLUTION RESPIRATORY (INHALATION) at 09:03

## 2022-01-23 RX ADMIN — HEPARIN SODIUM 5000 UNITS: 5000 INJECTION INTRAVENOUS; SUBCUTANEOUS at 21:27

## 2022-01-23 RX ADMIN — HEPARIN SODIUM 5000 UNITS: 5000 INJECTION INTRAVENOUS; SUBCUTANEOUS at 05:01

## 2022-01-23 RX ADMIN — SODIUM CHLORIDE, PRESERVATIVE FREE 10 ML: 5 INJECTION INTRAVENOUS at 06:00

## 2022-01-24 VITALS
SYSTOLIC BLOOD PRESSURE: 160 MMHG | OXYGEN SATURATION: 93 % | HEART RATE: 100 BPM | HEIGHT: 65 IN | BODY MASS INDEX: 19.49 KG/M2 | TEMPERATURE: 97.8 F | WEIGHT: 117 LBS | RESPIRATION RATE: 20 BRPM | DIASTOLIC BLOOD PRESSURE: 58 MMHG

## 2022-01-24 LAB
ANION GAP SERPL CALC-SCNC: 7 MMOL/L (ref 7–16)
BUN SERPL-MCNC: 44 MG/DL (ref 8–23)
CALCIUM SERPL-MCNC: 8.5 MG/DL (ref 8.3–10.4)
CHLORIDE SERPL-SCNC: 104 MMOL/L (ref 98–107)
CO2 SERPL-SCNC: 25 MMOL/L (ref 21–32)
CREAT SERPL-MCNC: 1.86 MG/DL (ref 0.6–1)
ERYTHROCYTE [DISTWIDTH] IN BLOOD BY AUTOMATED COUNT: 13.2 % (ref 11.9–14.6)
GLUCOSE SERPL-MCNC: 85 MG/DL (ref 65–100)
HCT VFR BLD AUTO: 33.1 % (ref 35.8–46.3)
HGB BLD-MCNC: 10.5 G/DL (ref 11.7–15.4)
MCH RBC QN AUTO: 29 PG (ref 26.1–32.9)
MCHC RBC AUTO-ENTMCNC: 31.7 G/DL (ref 31.4–35)
MCV RBC AUTO: 91.4 FL (ref 79.6–97.8)
NRBC # BLD: 0 K/UL (ref 0–0.2)
PLATELET # BLD AUTO: 260 K/UL (ref 150–450)
PMV BLD AUTO: 8.1 FL (ref 9.4–12.3)
POTASSIUM SERPL-SCNC: 3.7 MMOL/L (ref 3.5–5.1)
RBC # BLD AUTO: 3.62 M/UL (ref 4.05–5.2)
SODIUM SERPL-SCNC: 136 MMOL/L (ref 136–145)
WBC # BLD AUTO: 10.8 K/UL (ref 4.3–11.1)

## 2022-01-24 PROCEDURE — 74011636637 HC RX REV CODE- 636/637: Performed by: INTERNAL MEDICINE

## 2022-01-24 PROCEDURE — 94762 N-INVAS EAR/PLS OXIMTRY CONT: CPT

## 2022-01-24 PROCEDURE — 74011000250 HC RX REV CODE- 250: Performed by: EMERGENCY MEDICINE

## 2022-01-24 PROCEDURE — 74011250637 HC RX REV CODE- 250/637: Performed by: INTERNAL MEDICINE

## 2022-01-24 PROCEDURE — 74011250636 HC RX REV CODE- 250/636: Performed by: INTERNAL MEDICINE

## 2022-01-24 PROCEDURE — 94640 AIRWAY INHALATION TREATMENT: CPT

## 2022-01-24 PROCEDURE — 85027 COMPLETE CBC AUTOMATED: CPT

## 2022-01-24 PROCEDURE — 74011000258 HC RX REV CODE- 258: Performed by: INTERNAL MEDICINE

## 2022-01-24 PROCEDURE — 36415 COLL VENOUS BLD VENIPUNCTURE: CPT

## 2022-01-24 PROCEDURE — 94761 N-INVAS EAR/PLS OXIMETRY MLT: CPT

## 2022-01-24 PROCEDURE — 80048 BASIC METABOLIC PNL TOTAL CA: CPT

## 2022-01-24 PROCEDURE — 74011000250 HC RX REV CODE- 250: Performed by: INTERNAL MEDICINE

## 2022-01-24 RX ORDER — FUROSEMIDE 20 MG/1
20 TABLET ORAL DAILY
Qty: 30 TABLET | Refills: 1 | Status: SHIPPED | OUTPATIENT
Start: 2022-01-24 | End: 2022-02-07 | Stop reason: SDUPTHER

## 2022-01-24 RX ORDER — FUROSEMIDE 20 MG/1
20 TABLET ORAL DAILY
Status: DISCONTINUED | OUTPATIENT
Start: 2022-01-24 | End: 2022-01-24 | Stop reason: HOSPADM

## 2022-01-24 RX ORDER — PREDNISONE 20 MG/1
40 TABLET ORAL
Qty: 10 TABLET | Refills: 0 | Status: SHIPPED | OUTPATIENT
Start: 2022-01-25 | End: 2022-01-30

## 2022-01-24 RX ORDER — LANOLIN ALCOHOL/MO/W.PET/CERES
325 CREAM (GRAM) TOPICAL
Qty: 30 TABLET | Refills: 1 | Status: SHIPPED | OUTPATIENT
Start: 2022-01-25

## 2022-01-24 RX ADMIN — SODIUM CHLORIDE, PRESERVATIVE FREE 10 ML: 5 INJECTION INTRAVENOUS at 05:29

## 2022-01-24 RX ADMIN — PREDNISONE 40 MG: 20 TABLET ORAL at 08:42

## 2022-01-24 RX ADMIN — IPRATROPIUM BROMIDE AND ALBUTEROL SULFATE 3 ML: .5; 3 SOLUTION RESPIRATORY (INHALATION) at 08:05

## 2022-01-24 RX ADMIN — PIPERACILLIN AND TAZOBACTAM 3.38 G: 3; .375 INJECTION, POWDER, LYOPHILIZED, FOR SOLUTION INTRAVENOUS at 08:56

## 2022-01-24 RX ADMIN — HEPARIN SODIUM 5000 UNITS: 5000 INJECTION INTRAVENOUS; SUBCUTANEOUS at 05:29

## 2022-01-24 RX ADMIN — FUROSEMIDE 20 MG: 20 TABLET ORAL at 08:42

## 2022-01-24 RX ADMIN — FERROUS SULFATE TAB 325 MG (65 MG ELEMENTAL FE) 325 MG: 325 (65 FE) TAB at 08:41

## 2022-01-24 RX ADMIN — BUDESONIDE AND FORMOTEROL FUMARATE DIHYDRATE 2 PUFF: 80; 4.5 AEROSOL RESPIRATORY (INHALATION) at 08:05

## 2022-01-24 NOTE — PROGRESS NOTES
END OF SHIFT NOTE:    Intake/Output  01/24 0701 - 01/24 1900  In: 840 [P.O.:240; I.V.:600]  Out: 200 [Urine:200]   Voiding: YES  Catheter: NO  Drain:              Stool:  1 occurrences. Emesis:  0 occurrences. VITAL SIGNS  Patient Vitals for the past 12 hrs:   Temp Pulse Resp BP SpO2   01/24/22 1152 97.8 °F (36.6 °C) 100 20 (!) 160/58 93 %   01/24/22 1025     92 %   01/24/22 0805     91 %   01/24/22 0749 98.1 °F (36.7 °C) 91 20 (!) 148/54 92 %   01/24/22 0330 97.9 °F (36.6 °C) 93 21 (!) 143/62 95 %       Pain Assessment  Pain 1  Pain Scale 1: Numeric (0 - 10) (01/24/22 1152)  Pain Intensity 1: 0 (01/24/22 1152)  Patient Stated Pain Goal: 0 (01/24/22 0914)    Ambulating  Yes    Additional Information: pt vss a*o X's 4  No complaints of pain or discomfort reviewed DC instructions verbalized understanding    Shift report given to oncoming nurse at the bedside.     Sandi Fernandes RN

## 2022-01-24 NOTE — PROGRESS NOTES
01/24/22 1025   Oxygen Therapy   O2 Sat (%) 92 %  (Sitting in chair)   Pulse via Oximetry 113 beats per minute   O2 Device None (Room air)     Patient's sat while resting was 92% on room air before ambulation. While ambulating patient required 2 L of oxygen to maintain sat above protocol. Patient denied SOB, and no distress noted while ambulating. A.  Medicare has specific guidelines for documentation of ambulatory oxygen saturation testing, therefore all test results must be documented in the patient's EMR as outlined below. Room air: SpO2 with O2 and liter flow   Resting SpO2  92%  N/A   Ambulating SpO2  87% RESULTS: 93% on 2L             Respiratory Care Services     Policy Number: [id number]    Title: Home Oxygen Assessment Protocol    Effective Date:  4/9/14    Reviewed Date: 5/28/2018, 11/2020    Revised: 07/2019       I. Policy: The Respiratory Care Practitioner (RCP) shall assess all patients who meet Medicare's Home Oxygen Diagnostic Requirements. If a patient is unable to wean to room air within 48 hours of discharge, the RCP shall order a 3 step ambulatory oxygen saturation (SpO2) per protocol and instruct the patient in completing the test. The RCP shall document results of the test as outlined in this protocol. II. Purpose: Oxygen is used for short-term hospitalized patients and long-term therapy in patients with chronic lung disease and recurring congestive heart failure. [facility] for [facility] [employer] (CMS) has very specific guidelines and indications for its short-term use in the acute care setting and the long-term use in the home or other facility. This protocol will address the rationale and requirements for long-term oxygen therapy. Long-term oxygen therapy is delivered to reduce long-term complications of chronic hypoxemia, particularly cor pulmonale.  Oxygen supplementation in patients with chronic hypoxemia has been shown to improve survival, pulmonary hemodynamics, exercise capacity and neuropsychological performance. 1    III. Responsibility: Director of [address] [place] [address] and Respiratory Care Practitioners. IV. Home Oxygen Diagnostic Requirements:   B. Covered health conditions:  1. Severe primary lung disease  a. Chronic obstructive pulmonary disease  b. Diffuse interstitial lung disease  c. Cystic fibrosis  2. Bronchiectasis  3. Pulmonary neoplasm, primary or metastatic  4. Chronic bronchitis  5. Emphysema  6. Hypoxia-related symptoms or conditions that may improve with oxygen therapy such as  a. Pulmonary hypertension  b. Recurring congestive heart failure due to chronic cor pulmonale  c. Erythocytosis/erythrocythemia  C. Qualifying testing requirements  1. Testing must be performed within two days prior to discharge. 2. Test results must be documented in patient's medical record in approved format. D. Testing can be done under three conditions  1. A test during rest.  a. Oxygen is considered medically necessary if SpO2 is less than or equal to 88%. b. If SpO2 is greater than 88%, proceed to step 2.  2. A test taken on room air during exercise  a. If patient meets qualifying threshold of SpO2 less than or equal to 88% while exercising, all three of the required tests below must be performed within same testing session and be recorded in the patient's medical record. i. A test taken during rest while patient breathes room air. ii. A test during exercise while patient breathes room air.  iii. A test taken during exercise with oxygen applied. (to demonstrate improvement of hypoxia). 3. A test taken during sleep.  a. If patient is tested during sleep, test must have at least two hours of recorded time. b. Test must indicate arterial oxygen saturation of 88% or less for at least 5 minutes of   testing period. c. A patient tested during sleep will not qualify for portable oxygen.   d. A physician order will be required for an overnight oximetry test.  E. Regardless of test condition, the following values apply to all:  1. Group I: (Requires annual recertification)  a. Patient is on room air while at rest (awake) when tested. b.  Arterial oxygen saturation (SaO2) is at or below 88% or  c. PaO2 is at or below <55mm Hg   2. Group II: (Recertification and retesting are required 61-90 days after initial start)  a. Patient is on room air at rest while awake when tested. b. PaO2 = 5659 mmHg or  c. SaO2 89% acceptable only with secondary diagnosis of  - Edema suggesting congestive heart failure  - Pulmonary hypertension/cor pulmonale with P wave > 3mm in lead II, III, or AVF  - Erythrocythemia with Hct >56%  3. Group III:   If PaO2 > 60 mmHg or   - SaO2 >90% there is a presumption of noncoverage. 4. If liter flow is greater than 4LPM, patient must meet Group 1 or Group II criteria while patient is receiving oxygen at a rate of 4LPM or higher  5. All patients must be tested in a stable state including those discharged from the hospital.  6. All coexisting diseases or conditions that can cause hypoxia must be treated and patient must be tested in a chronic stable state before oxygen therapy is qualified. V. Procedure for SpO2 testing at rest.  A. Obtain a 30 second room air SpO2 check while patient is on room air, rested and awake. 1. If SpO2 is 88% increase O2 by 1 L to maintain SpO2 of 90%. 2. Document results in patient's EMR.    VI. Procedure for ambulatory SpO2 testing  A. For patient who meets the Covered Health Conditions and is unable to wean to room air within 48 hours of discharge, complete an ambulatory SaO2. B. Preparation  1. Write an order for 3 step ambulatory oxygen saturation test per protocol. 2. Obtain pulse oximeter and insure that it is working accurately.   3. Perform hand hygiene per hospital policy utilizing Standard Precautions for all patients and following transmission-based isolation as indicated per hospital policy. 4. Identify patient, verify name and birth date via ID bracelet. 5. Introduce self and identify department. 6. Explain procedure to patient and family and confirm understanding. C. Assessment  1. Assure that patient is on room air prior to test.  2. Fingernail polish if worn by patient must be removed before testing. 3. Ask patient to sit in an upright position. 4. Resting SaO2 assessment  a. Perform a 30 second resting room air SaO2.  b.  If SaO2 is 88% or less Medicare considers oxygen is medically necessary  c. Document findings in patient EMR. d.  If SaO2 is greater than 88%, proceed to the next step. 5. Ambulatory Room Air SaO2 check  a. Ask patient to walk for 5 minutes on room air or as long as tolerated. b. If patients ordinarily use a cane, walker or rollator, they should be used during test.  c. Instruct patient to walk at a comfortable pace and to breathe naturally during test.  d. Monitor and record patient's heart rate, SaO2 and exercise endurance.  e. If SaO2 is 88% or less post exercise, an ambulatory test on oxygen must be performed. 6. Ambulatory SaO2 on Oxygen  a. The P shall place the patient on oxygen to achieve a SaO2 of 90%. b. Mayola Fleischer patient to walk for 5 minutes or as long as tolerated. c. If patients ordinarily use a cane, walker or rollator, they should be used during test  d. Monitor and record patient's heart rate, SaO2 and exercise endurance.  e. If Sa02 decreases to the range of 80% to 84% the flow shall be increased by 2LPM.   f. If SaO2 decreases to the range of 85%-89% increased oxygen by 1 LPM.  g. If SaO2 is less than 80%, the patient is not appropriate for ambulation. VII. Documentation  A. Medicare has specific guidelines for documentation of ambulatory oxygen saturation testing, therefore all test results must be documented in the patient's EMR as outlined below.      Room air: SpO2 with O2 and liter flow   Resting SpO2  92%  N/A   Ambulating SpO2 87% RESULTS: 93% on 2L     VIII. Reference:       Oswald John 30 for [facility] Medicaid Services. Home Oxygen Therapy.  Medicare        Part B- Oxygen Coverage

## 2022-01-24 NOTE — PROGRESS NOTES
Care Management Interventions  PCP Verified by CM: Yes  Mode of Transport at Discharge: [address] (CM Consult): DME/Supply Assistance (O2 from [employer])  Support Systems: Other Family Member(s)  Confirm Follow Up Transport: Family  Discharge Location  Patient Expects to be Discharged to[de-identified] Home (new O2 set up with [employer])  SW met with pt and her sister who will take her home today. Pt is requiring 2 liters of O2 with exertion. Pt had no preference of DME company and agreeable to [employer].  Pt given portable tank and aware company will call for delivery of concentrator to home today. No additional needs anticipated for dc.    Ignacio Alston, BSW

## 2022-01-24 NOTE — PROGRESS NOTES
END OF SHIFT NOTE:    Ultrasound still needs to be done. VSS, no c/o pain, N/V/D. No needs at this time. Intake/Output  01/23 1901 - 01/24 0700  In: 240 [P.O.:240]  Out: -    Voiding: YES  Catheter: NO  Drain:              Stool:  1 occurrences. Emesis:  0 occurrences. VITAL SIGNS  Patient Vitals for the past 12 hrs:   Temp Pulse Resp BP SpO2   01/23/22 1603 97.7 °F (36.5 °C) (!) 102 20 (!) 159/73 94 %   01/23/22 1104 97.4 °F (36.3 °C) (!) 105 20 (!) 151/68 96 %   01/23/22 0746 97.9 °F (36.6 °C) (!) 105 18 (!) 156/62 92 %       Pain Assessment  Pain 1  Pain Scale 1: Numeric (0 - 10) (01/23/22 1415)  Pain Intensity 1: 0 (01/23/22 1415)  Patient Stated Pain Goal: 0 (01/23/22 1415)    Ambulating  Yes    Additional Information:     Shift report given to oncoming nurse at the bedside.     Trevor Dowling

## 2022-01-24 NOTE — DISCHARGE SUMMARY
Hospitalist Discharge Summary   Admit Date:  2022  5:12 PM   DC Note date: 2022  Name:  Harish Starr   Age:  80 y.o. Sex:  female  :  1931   MRN:  [id number]   Room:  Formerly named [facility]  PCP:  Rohan Claire DO    Presenting Complaint: Shortness of Breath and Pneumonia    Initial Admission Diagnosis: Community acquired pneumonia [J18.9]  Lower extremity edema [R60.0]     Problem List for this Hospitalization:  Hospital Problems as of 2022 Date Reviewed: 2021          Codes Class Noted - Resolved POA    Lower extremity edema ICD-10-CM: R60.0  ICD-9-CM: 782.3  2022 - Present Yes        * (Principal) Community acquired pneumonia ICD-10-CM: J18.9  ICD-9-CM: 656  2022 - Present Yes        COPD exacerbation ([facility] ICD-10-CM: J44.1  ICD-9-CM: 491.21  2022 - Present Yes        Anemia ICD-10-CM: D64.9  ICD-9-CM: 285.9  2022 - Present Yes        Bronchiectasis without complication ([facility] EVF-18-UE: J47.9  ICD-9-CM: 494.0  3/27/2019 - Present Yes        Hypothyroidism due to acquired atrophy of thyroid ICD-10-CM: E03.4  ICD-9-CM: 244.8, 246.8  10/21/2015 - Present Yes        COPD (chronic obstructive pulmonary disease) ([facility] ICD-10-CM: J44.9  ICD-9-CM: 936  Unknown - Present Yes        Hyperlipidemia ICD-10-CM: E78.5  ICD-9-CM: 272.4  Unknown - Present Yes            Did Patient have Sepsis (YES OR NO): No    Hospital Course:  Please refer to the admission H&P for details of presentation. In summary, Harish Starr is a 80 y.o. female with past medical history significant for COPD, hypothyroidism, hyperlipidemia, who presented with low-grade fevers, cough with white productive sputum, lower extremity swelling and shortness of breath.  Patient was diagnosed with pneumonia on  by her pulmonologist and was given Augmentin with some improvements but has not resolved.  Also having LE swelling and has done lower extremity arterial duplex which showed moderate stenosis of bilateral common femoral, deep femoral and superficial femoral arteries have moderate stenosis.  She is pending vascular surgery follow-up appointment. She was started on IV Abx and steroids with blood in her respiration. Lower extremity Dopplers were negative for DVT. Echocardiogram shows EF of 65 to 70% with indeterminate diastolic dysfunction, hyperdynamic left ventricular systolic function with mildly dilated left atrium. She was also started on Lasix given her elevated proBNP but was held due to slight elevation in creatinine. Her O2 requirement has been weaned to room air at rest but she will require O2 on ambulation per O2 walk test.  She was also noted to have some iron deficiency anemia and received IV iron x1. And will be started on p.o. iron on discharge. Patient is medically stable for discharge. Patient is to continue taking medications as prescribed and to follow up with PCP on discharge. Patient is instructed to to call a physician or return to ED if any concerns/symptoms worsened. Discharge summary and encounter summary was sent to PCP electronically via \"Comm Mgt\" link in [facility], if possible. Disposition: Home  Diet: ADULT DIET Regular; 1800 ml  Code Status: Full Code    Follow Up Orders:  No orders of the defined types were placed in this encounter. Follow-up Information     Follow up With Specialties Details Why Contact Info    Pat Coyle, DO Family Medicine In 1 week Check on renal function panel. Started on Lasix for swelling and likely diastolic CHF [address] [place] [address]  [phone]            Follow up labs/diagnostics (ultimately defer to outpatient provider):     1. PCP in 1 week to check renal function    Time spent in patient discharge and coordination 40 minutes. Plan was discussed with patient. All questions answered. Patient was stable at time of discharge.   Instructions given to call a physician or return if any concerns. Discharge Info:   Current Discharge Medication List      START taking these medications    Details   ferrous sulfate 325 mg (65 mg iron) tablet Take 1 Tablet by mouth daily (with breakfast). Qty: 30 Tablet, Refills: 1  Start date: 1/25/2022      furosemide (LASIX) 20 mg tablet Take 1 Tablet by mouth daily. Qty: 30 Tablet, Refills: 1  Start date: 1/24/2022      predniSONE (DELTASONE) 20 mg tablet Take 40 mg by mouth daily (with breakfast) for 5 days. Qty: 10 Tablet, Refills: 0  Start date: 1/25/2022, End date: 1/30/2022         CONTINUE these medications which have NOT CHANGED    Details   albuterol (ProAir HFA) 90 mcg/actuation inhaler Take 2 Puffs by inhalation every four (4) hours as needed for Wheezing. Qty: 1 Each, Refills: 11    Comments: Please consider 90 day supplies to promote better adherence  Associated Diagnoses: Bronchiectasis without complication (HCC)      triamcinolone acetonide (KENALOG) 0.1 % topical cream APPLY TO THE AFFECTED AREA ON LEGS TWICE DAILY      cilostazoL (PLETAL) 100 mg tablet Take 1 Tablet by mouth Before breakfast and dinner. Qty: 60 Tablet, Refills: 5    Associated Diagnoses: PAD (peripheral artery disease) (HCC)      montelukast (SINGULAIR) 10 mg tablet TAKE 1 TABLET BY MOUTH  DAILY  Qty: 90 Tablet, Refills: 3    Comments: Requesting 1 year supply  Associated Diagnoses: Environmental allergies      levothyroxine (SYNTHROID) 75 mcg tablet Take 1 Tablet by mouth Daily (before breakfast). Qty: 90 Tablet, Refills: 3    Comments: **Patient requests 90 days supply**  Associated Diagnoses: Hypothyroidism due to acquired atrophy of thyroid      !! raloxifene (EVISTA) 60 mg tablet Take 1 tablet by mouth once daily  Qty: 90 Tab, Refills: 3      !! raloxifene (EVISTA) 60 mg tablet Take 1 Tab by mouth daily. Indications: post-menopausal osteoporosis prevention  Qty: 90 Tab, Refills: 3    Comments:  Will use good rx coupon  Associated Diagnoses: Age related osteoporosis, unspecified pathological fracture presence      albuterol (PROVENTIL VENTOLIN) 2.5 mg /3 mL (0.083 %) nebu USE 1 VIAL BY NEBULIZER EVERY 4 HOURS AS NEEDED FOR WHEEZING. Qty: 540 mL, Refills: 5    Associated Diagnoses: Bronchiectasis with acute lower respiratory infection (HCC)      amLODIPine (NORVASC) 5 mg tablet Take 1 Tab by mouth daily. Qty: 90 Tab, Refills: 3      Mucus Clearing Device (FLUTTER) byron Use twice daily with albuterol nebulizer  Qty: 1 Device, Refills: 0    Associated Diagnoses: Bronchiectasis with acute lower respiratory infection (HCC)      omega 3-dha-epa-fish oil (FISH OIL) 100-160-1,000 mg cap Take  by mouth. B.infantis-B.ani-B.long-B.bifi (PROBIOTIC 4X) 10-15 mg TbEC Take  by mouth. Magnesium Oxide 500 mg cap Take  by mouth. FOLIC ACID/MULTIVIT-MIN/LUTEIN (CENTRUM SILVER PO) Take  by mouth. omeprazole (PRILOSEC) 20 mg capsule Take 20 mg by mouth daily. fluticasone (FLONASE) 50 mcg/actuation nasal spray 1 [place] by Both Nostrils route daily for 90 days. Indications: ALLERGIC RHINITIS  Qty: 3 Bottle, Refills: 3      aspirin delayed-release 81 mg tablet Take  by mouth daily. BIOTIN PO Take  by mouth daily. CALCIUM CARBONATE/VITAMIN D3 (CALTRATE 600 + D PO) Take  by mouth daily. UBIDECARENONE (CO Q-10 PO) Take  by mouth daily. lutein 20 mg cap Take  by mouth daily. ASCORBATE CALCIUM (VITAMIN C PO) Take  by mouth daily. !! - Potential duplicate medications found. Please discuss with provider.       STOP taking these medications       amoxicillin-clavulanate (AUGMENTIN) 875-125 mg per tablet Comments:   Reason for Stopping:         losartan-hydroCHLOROthiazide (HYZAAR) 50-12.5 mg per tablet Comments:   Reason for Stopping:         GLUCOSAMINE/CHONDROITIN SULF A (GLUCOSAMINE-CHONDROITIN PO) Comments:   Reason for Stopping:               Procedures done this admission:  * No surgery found *    Consults this admission:  None    Echocardiogram/EKG results:  Results from Hospital Encounter encounter on 01/21/22    ECHO ADULT COMPLETE    Interpretation Summary    Left Ventricle: Left ventricle size is normal. Normal wall thickness. Normal wall motion. Hyperdynamic left ventricular systolic function with a visually estimated EF of 65 - 70%.   Aortic Valve: Mild to moderate transvalvular regurgitation.   Left Atrium: Left atrium is mildly dilated.   Technical qualifiers: Echo study was technically difficult due to low parasternal window.   Contrast used: Definity. Technically very difficult study with poor acoustic window, multiple off axis views, poor visualization of all valves and hyperdynamic LV throughout study. There is a significant LVOT gradient with valsalva. Consider additional imaging if clinically indicated. EKG Results     Procedure 720 Value Units Date/Time    EKG, 12 LEAD, INITIAL [[id number]] Collected: 01/21/22 1607    Order Status: Completed Updated: 01/21/22 2107     Ventricular Rate 99 BPM      Atrial Rate 99 BPM      P-R Interval 140 ms      QRS Duration 68 ms      Q-T Interval 336 ms      QTC Calculation (Bezet) 431 ms      Calculated P Axis 0 degrees      Calculated R Axis 3 degrees      Calculated T Axis 65 degrees      Diagnosis --     Normal sinus rhythm  Possible Anterior infarct , age undetermined  Abnormal ECG  No previous ECGs available  Confirmed by Antonio Larsen MD ()TREVOR ([id number]) on 1/21/2022 9:07:47 PM            Diagnostic Imaging/Tests:   XR CHEST PORT    Result Date: 1/21/2022  PORTABLE CHEST, January 21, 2022 at 1556 hours CLINICAL HISTORY:  Cough, shortness of breath, and fever for 2 weeks. COMPARISON:  January 11, 2022 July 2, 2020 FINDINGS: PA erect image demonstrates patchy right basilar infiltrate which is decreased from January 11. Very small right pleural effusion persists. The heart size is within normal limits without evidence of congestive heart failure or pneumothorax.   The bony thorax appears intact on this view. There are overlying radiopaque support devices. INTERVAL IMPROVEMENT IN RIGHT BASILAR PNEUMONIA. DUPLEX LOWER EXT VENOUS BILAT    Result Date: 1/22/2022  EXAM: DUPLEX LOWER EXT VENOUS BILAT HISTORY: LE swelling. TECHNIQUE: Sonographic evaluation of the bilateral lower extremities was performed utilizing 2-D grayscale, color flow Doppler imaging, and spectral waveform analysis. COMPARISON: None. FINDINGS: Bilateral common femoral, femoral, popliteal, and greater saphenous veins demonstrate normal compressibility and Doppler waveforms. The saphenofemoral junction is unremarkable. Bilateral posterior tibial and peroneal veins are without evidence of thrombosis. No evidence of deep venous thrombosis in the bilateral lower extremities. ECHO ADULT COMPLETE    Result Date: 1/22/2022    Left Ventricle: Left ventricle size is normal. Normal wall thickness. Normal wall motion. Hyperdynamic left ventricular systolic function with a visually estimated EF of 65 - 70%.   Aortic Valve: Mild to moderate transvalvular regurgitation.   Left Atrium: Left atrium is mildly dilated.   Technical qualifiers: Echo study was technically difficult due to low parasternal window.   Contrast used: Definity. Technically very difficult study with poor acoustic window, multiple off axis views, poor visualization of all valves and hyperdynamic LV throughout study. There is a significant LVOT gradient with valsalva. Consider additional imaging if clinically indicated.        All Micro Results     Procedure Component Value Units Date/Time    BLOOD CULTURE [[id number]] Collected: 01/21/22 1605    Order Status: Completed Specimen: Blood Updated: 01/24/22 0713     Special Requests: --        LEFT  Antecubital       Culture result: NO GROWTH 3 DAYS       BLOOD CULTURE [[id number]] Collected: 01/21/22 1726    Order Status: Completed Specimen: Blood Updated: 01/24/22 0713     Special Requests: --        NO SPECIAL REQUESTS  RIGHT  Antecubital       Culture result: NO GROWTH 3 DAYS       COVID-19 RAPID TEST [[id number]] Collected: 01/21/22 1532    Order Status: Completed Specimen: Nasopharyngeal Updated: 01/21/22 0842     Specimen source NP Swab        COVID-19 rapid test Not detected        Comment:      The specimen is NEGATIVE for SARS-CoV-2, the novel coronavirus associated with COVID-19. A negative result does not rule out COVID-19. This test has been authorized by the FDA under an Emergency Use Authorization (EUA) for use by authorized laboratories. Fact sheet for Healthcare Providers: [employer]  Fact sheet for Patients: [employer]       Methodology: Isothermal Nucleic Acid Amplification               Labs: Results:       BMP, Mg, Phos Recent Labs     01/24/22  0550 01/23/22  0523 01/22/22  0532    137 136   K 3.7 3.5 3.8    103 101   CO2 25 28 30   AGAP 7 6* 5*   BUN 44* 41* 33*   CREA 1.86* 2.06* 1.98*   CA 8.5 8.6 8.9   GLU 85 144* 150*      CBC Recent Labs     01/24/22  0550 01/23/22  0523 01/22/22  0532 01/21/22  1611 01/21/22  1611   WBC 10.8 9.4 2.9*   < > 8.0   RBC 3.62* 3.18* 3.24*   < > 3.35*   HGB 10.5* 9.1* 9.4*   < > 9.7*   HCT 33.1* 28.2* 28.8*   < > 30.2*    213 201   < > 225   GRANS  --   --   --   --  82*   LYMPH  --   --   --   --  3*   EOS  --   --   --   --  4   MONOS  --   --   --   --  9   BASOS  --   --   --   --  1   IG  --   --   --   --  1   ANEU  --   --   --   --  6.6   ABL  --   --   --   --  0.2*   NARENDRA  --   --   --   --  0.3   ABM  --   --   --   --  0.7   ABB  --   --   --   --  0.1   AIG  --   --   --   --  0.0    < > = values in this interval not displayed.       LFT Recent Labs     01/21/22  1611   ALT 19   AP 63   TP 7.6   ALB 2.7*   GLOB 4.9*   AGRAT 0.6*      Cardiac Testing No results found for: BNPP, BNP, CPK, RCK1, RCK2, RCK3, RCK4, CKMB, CKNDX, CKND1, TROPT, TROIQ   Coagulation Tests No results found for: PTP, INR, APTT, INREXT, INREXT   A1c No results found for: HBA1C, TPP2ZLIY, RXF4LVWN   Lipid Panel Lab Results   Component Value Date/Time    Cholesterol, total 199 12/27/2021 09:44 AM    HDL Cholesterol 63 12/27/2021 09:44 AM    LDL, calculated 113 (H) 12/27/2021 09:44 AM    LDL, calculated 99 03/14/2019 10:42 AM    VLDL, calculated 23 12/27/2021 09:44 AM    VLDL, calculated 28 03/14/2019 10:42 AM    Triglyceride 131 12/27/2021 09:44 AM      Thyroid Panel Lab Results   Component Value Date/Time    TSH 0.420 01/23/2022 05:23 AM    TSH 0.699 12/27/2021 09:44 AM        Most Recent UA Lab Results   Component Value Date/Time    Color YELLOW 01/23/2022 09:19 AM    Appearance CLEAR 01/23/2022 09:19 AM    Specific gravity 1.014 01/23/2022 09:19 AM    pH (UA) 7.0 01/23/2022 09:19 AM    Protein 30 (A) 01/23/2022 09:19 AM    Glucose Negative 01/23/2022 09:19 AM    Ketone Negative 01/23/2022 09:19 AM    Bilirubin Negative 01/23/2022 09:19 AM    Blood Negative 01/23/2022 09:19 AM    Urobilinogen 0.2 01/23/2022 09:19 AM    Nitrites Negative 01/23/2022 09:19 AM    Leukocyte Esterase Negative 01/23/2022 09:19 AM    WBC 0-3 01/23/2022 09:19 AM    RBC 0-3 01/23/2022 09:19 AM    Epithelial cells 0-3 01/23/2022 09:19 AM    Bacteria 0 01/23/2022 09:19 AM    Casts 0-3 01/23/2022 09:19 AM          All Labs from Last 24 Hrs:  Recent Results (from the past 24 hour(s))   METABOLIC PANEL, BASIC    Collection Time: 01/24/22  5:50 AM   Result Value Ref Range    Sodium 136 136 - 145 mmol/L    Potassium 3.7 3.5 - 5.1 mmol/L    Chloride 104 98 - 107 mmol/L    CO2 25 21 - 32 mmol/L    Anion gap 7 7 - 16 mmol/L    Glucose 85 65 - 100 mg/dL    BUN 44 (H) 8 - 23 MG/DL    Creatinine 1.86 (H) 0.6 - 1.0 MG/DL    GFR est AA 33 (L) >60 ml/min/1.73m2    GFR est non-AA 27 (L) >60 ml/min/1.73m2    Calcium 8.5 8.3 - 10.4 MG/DL   CBC W/O DIFF    Collection Time: 01/24/22  5:50 AM   Result Value Ref Range    WBC 10.8 4.3 - 11.1 K/uL    RBC 3.62 (L) 4.05 - 5.2 M/uL    HGB 10.5 (L) 11.7 - 15.4 g/dL    HCT 33.1 (L) 35.8 - 46.3 %    MCV 91.4 79.6 - 97.8 FL    MCH 29.0 26.1 - 32.9 PG    MCHC 31.7 31.4 - 35.0 g/dL    RDW 13.2 11.9 - 14.6 %    PLATELET 469 078 - 751 K/uL    MPV 8.1 (L) 9.4 - 12.3 FL    ABSOLUTE NRBC 0.00 0.0 - 0.2 K/uL       Current Med List in Hospital:   Current Facility-Administered Medications   Medication Dose Route Frequency    furosemide (LASIX) tablet 20 mg  20 mg Oral DAILY    ferrous sulfate tablet 325 mg  1 Tablet Oral DAILY WITH BREAKFAST    predniSONE (DELTASONE) tablet 40 mg  40 mg Oral DAILY WITH BREAKFAST    sodium chloride (NS) flush 5-10 mL  5-10 mL IntraVENous Q8H    sodium chloride (NS) flush 5-10 mL  5-10 mL IntraVENous PRN    sodium chloride (NS) flush 5-40 mL  5-40 mL IntraVENous Q8H    sodium chloride (NS) flush 5-40 mL  5-40 mL IntraVENous PRN    acetaminophen (TYLENOL) tablet 650 mg  650 mg Oral Q6H PRN    Or    acetaminophen (TYLENOL) suppository 650 mg  650 mg Rectal Q6H PRN    polyethylene glycol (MIRALAX) packet 17 g  17 g Oral DAILY PRN    promethazine (PHENERGAN) tablet 12.5 mg  12.5 mg Oral Q6H PRN    Or    ondansetron (ZOFRAN) injection 4 mg  4 mg IntraVENous Q6H PRN    heparin (porcine) injection 5,000 Units  5,000 Units SubCUTAneous Q8H    albuterol (PROVENTIL VENTOLIN) nebulizer solution 2.5 mg  2.5 mg Nebulization Q6H PRN    budesonide-formoterol (SYMBICORT) 80-4.5 mcg inhaler  2 Puff Inhalation BID RT    albuterol-ipratropium (DUO-NEB) 2.5 MG-0.5 MG/3 ML  3 mL Nebulization Q6HWA RT    piperacillin-tazobactam (ZOSYN) 3.375 g in 0.9% sodium chloride (MBP/ADV) 100 mL MBP  3.375 g IntraVENous Q12H    [Held by provider] furosemide (LASIX) injection 40 mg  40 mg IntraVENous DAILY       Allergies   Allergen Reactions    Biaxin [Clarithromycin] Rash    Other Medication Rash     BLAXIN     Immunization History   Administered Date(s) Administered    COVID-19, Pfizer Purple top, DILUTE for use, 12+ yrs, 30mcg/0.3mL dose 01/22/2021, 02/12/2021, 11/10/2021    Influenza High Dose Vaccine PF 09/10/2018, 09/21/2019, 09/01/2020, 10/01/2021    Influenza Vaccine 09/15/2014    Influenza Vaccine (Tri) Adjuvanted (>65 Yrs FLUAD TRI 05353) 09/21/2019    Influenza, High-dose, Quadrivalent (>65 Yrs Fluzone High Dose Quad 17276) 09/02/2020    Pneumococcal Conjugate (PCV-13) 04/04/2017    Pneumococcal Polysaccharide (PPSV-23) 04/18/2019    Zoster Recombinant 11/08/2019       Recent Vital Data:  Patient Vitals for the past 24 hrs:   Temp Pulse Resp BP SpO2   01/24/22 1152 97.8 °F (36.6 °C) 100 20 (!) 160/58 93 %   01/24/22 1025     92 %   01/24/22 0805     91 %   01/24/22 0749 98.1 °F (36.7 °C) 91 20 (!) 148/54 92 %   01/24/22 0330 97.9 °F (36.6 °C) 93 21 (!) 143/62 95 %   01/23/22 2302 97.7 °F (36.5 °C) (!) 103 20 (!) 148/64 95 %   01/23/22 2039     93 %   01/23/22 2038 97.5 °F (36.4 °C) (!) 104 20 (!) 164/76 94 %   01/23/22 1603 97.7 °F (36.5 °C) (!) 102 20 (!) 159/73 94 %     Oxygen Therapy  O2 Sat (%): 93 % (01/24/22 1152)  Pulse via Oximetry: 113 beats per minute (01/24/22 1025)  O2 Device: None (Room air) (01/24/22 1152)  O2 Flow Rate (L/min): 0 l/min (01/22/22 0838)    Estimated body mass index is 19.47 kg/m² as calculated from the following:    Height as of this encounter: 5' 5\" (1.651 m). Weight as of this encounter: 53.1 kg (117 lb). Intake/Output Summary (Last 24 hours) at 1/24/2022 1314  Last data filed at 1/24/2022 1129  Gross per 24 hour   Intake 1320 ml   Output 300 ml   Net 1020 ml         Physical Exam:  General:          Well nourished.  No overt distress  Head:               Normocephalic, atraumatic  Eyes:               Sclerae appear normal.  Pupils equally round. ENT:                Nares appear normal, no drainage.  Moist oral mucosa  Neck:               No restricted ROM.  Edwin Sinclair  CV:                  RRR.  No jugular venous distension. Lungs:             Scattered crackles, no wheezing   Abdomen:        Bowel sounds present.  Soft, nontender, nondistended. Extremities:     No cyanosis or clubbing.  2+ edema  Skin:                No rashes and normal coloration.   Warm and dry.    Neuro:             CN II-XII grossly intact.   A&Ox3  Psych:             Normal mood and affect.        Signed:  Kyle Dolan MD    Part of this note may have been written by using a voice dictation software. The note has been proof read but may still contain some grammatical/other typographical errors.

## 2022-01-26 LAB
BACTERIA SPEC CULT: NORMAL
BACTERIA SPEC CULT: NORMAL
SERVICE CMNT-IMP: NORMAL
SERVICE CMNT-IMP: NORMAL

## 2022-02-08 ENCOUNTER — HOME HEALTH ADMISSION (OUTPATIENT)
Dept: HOME HEALTH SERVICES | Facility: HOME HEALTH | Age: 87
End: 2022-02-08
Payer: MEDICARE

## 2022-02-09 ENCOUNTER — HOME CARE VISIT (OUTPATIENT)
Dept: SCHEDULING | Facility: HOME HEALTH | Age: 87
End: 2022-02-09
Payer: MEDICARE

## 2022-02-09 VITALS
SYSTOLIC BLOOD PRESSURE: 150 MMHG | TEMPERATURE: 98.5 F | RESPIRATION RATE: 16 BRPM | HEART RATE: 104 BPM | DIASTOLIC BLOOD PRESSURE: 60 MMHG | OXYGEN SATURATION: 95 %

## 2022-02-09 PROCEDURE — G0151 HHCP-SERV OF PT,EA 15 MIN: HCPCS

## 2022-02-09 PROCEDURE — 400013 HH SOC

## 2022-02-10 ENCOUNTER — HOME CARE VISIT (OUTPATIENT)
Dept: SCHEDULING | Facility: HOME HEALTH | Age: 87
End: 2022-02-10
Payer: MEDICARE

## 2022-02-10 VITALS
OXYGEN SATURATION: 96 % | HEART RATE: 80 BPM | RESPIRATION RATE: 18 BRPM | SYSTOLIC BLOOD PRESSURE: 138 MMHG | DIASTOLIC BLOOD PRESSURE: 70 MMHG | TEMPERATURE: 98 F

## 2022-02-10 PROCEDURE — G0157 HHC PT ASSISTANT EA 15: HCPCS

## 2022-02-12 ENCOUNTER — HOME CARE VISIT (OUTPATIENT)
Dept: SCHEDULING | Facility: HOME HEALTH | Age: 87
End: 2022-02-12
Payer: MEDICARE

## 2022-02-12 PROCEDURE — G0299 HHS/HOSPICE OF RN EA 15 MIN: HCPCS

## 2022-02-13 VITALS
OXYGEN SATURATION: 98 % | DIASTOLIC BLOOD PRESSURE: 62 MMHG | BODY MASS INDEX: 17.97 KG/M2 | RESPIRATION RATE: 20 BRPM | TEMPERATURE: 99.2 F | HEIGHT: 65 IN | SYSTOLIC BLOOD PRESSURE: 140 MMHG

## 2022-02-14 ENCOUNTER — HOME CARE VISIT (OUTPATIENT)
Dept: SCHEDULING | Facility: HOME HEALTH | Age: 87
End: 2022-02-14
Payer: MEDICARE

## 2022-02-14 VITALS
RESPIRATION RATE: 20 BRPM | DIASTOLIC BLOOD PRESSURE: 62 MMHG | TEMPERATURE: 97.2 F | SYSTOLIC BLOOD PRESSURE: 146 MMHG | OXYGEN SATURATION: 92 % | HEART RATE: 103 BPM

## 2022-02-14 PROCEDURE — G0151 HHCP-SERV OF PT,EA 15 MIN: HCPCS

## 2022-02-15 ENCOUNTER — HOME CARE VISIT (OUTPATIENT)
Dept: SCHEDULING | Facility: HOME HEALTH | Age: 87
End: 2022-02-15
Payer: MEDICARE

## 2022-02-15 VITALS
DIASTOLIC BLOOD PRESSURE: 68 MMHG | RESPIRATION RATE: 18 BRPM | SYSTOLIC BLOOD PRESSURE: 134 MMHG | TEMPERATURE: 97.8 F | HEART RATE: 85 BPM

## 2022-02-15 VITALS — SYSTOLIC BLOOD PRESSURE: 128 MMHG | DIASTOLIC BLOOD PRESSURE: 66 MMHG

## 2022-02-15 PROCEDURE — G0299 HHS/HOSPICE OF RN EA 15 MIN: HCPCS

## 2022-02-16 ENCOUNTER — HOME CARE VISIT (OUTPATIENT)
Dept: SCHEDULING | Facility: HOME HEALTH | Age: 87
End: 2022-02-16
Payer: MEDICARE

## 2022-02-16 VITALS
RESPIRATION RATE: 16 BRPM | SYSTOLIC BLOOD PRESSURE: 158 MMHG | OXYGEN SATURATION: 93 % | TEMPERATURE: 98.3 F | HEART RATE: 100 BPM | DIASTOLIC BLOOD PRESSURE: 57 MMHG

## 2022-02-16 PROCEDURE — G0157 HHC PT ASSISTANT EA 15: HCPCS

## 2022-02-16 NOTE — CASE COMMUNICATION
Pt's BP was quite low this visit. Pt's BP on both readings came to roughly 120/40. PTA also checked pt's BP with the pt's electronic cuff. That reading was 158/57. BP fluctuated greatly between electronic and manual readings. Due to these inconsistent readings, PTA contacted physician at 12:19pm and spoke with representative to leave message. Dr. Leanna Atkins' office indicated that they would want to see the pt Friday for further evaluation. PTA informed them that PTA was no longer with the pt, and PTA asked Dr. Leanna Atkins' office to contact the pt to relay any further information. Awaiting further necessary correspondence.

## 2022-02-23 ENCOUNTER — HOME CARE VISIT (OUTPATIENT)
Dept: SCHEDULING | Facility: HOME HEALTH | Age: 87
End: 2022-02-23
Payer: MEDICARE

## 2022-02-23 VITALS
SYSTOLIC BLOOD PRESSURE: 138 MMHG | RESPIRATION RATE: 15 BRPM | OXYGEN SATURATION: 95 % | DIASTOLIC BLOOD PRESSURE: 48 MMHG | HEART RATE: 96 BPM | TEMPERATURE: 98.5 F

## 2022-02-23 PROCEDURE — G0157 HHC PT ASSISTANT EA 15: HCPCS

## 2022-02-23 PROCEDURE — G0299 HHS/HOSPICE OF RN EA 15 MIN: HCPCS

## 2022-02-23 NOTE — CASE COMMUNICATION
Pt's BP was 138/48 today. The pt was asymptomatic, so light exercise was tolerated well. PTA simply informing, as vitals were outside of parameters. Feel free to advise, as needed. Thanks!

## 2022-02-24 VITALS
RESPIRATION RATE: 18 BRPM | SYSTOLIC BLOOD PRESSURE: 151 MMHG | TEMPERATURE: 98 F | OXYGEN SATURATION: 100 % | HEART RATE: 70 BPM | DIASTOLIC BLOOD PRESSURE: 60 MMHG

## 2022-02-25 ENCOUNTER — HOME CARE VISIT (OUTPATIENT)
Dept: SCHEDULING | Facility: HOME HEALTH | Age: 87
End: 2022-02-25
Payer: MEDICARE

## 2022-02-25 VITALS
DIASTOLIC BLOOD PRESSURE: 60 MMHG | RESPIRATION RATE: 15 BRPM | OXYGEN SATURATION: 94 % | TEMPERATURE: 98.4 F | HEART RATE: 96 BPM | SYSTOLIC BLOOD PRESSURE: 138 MMHG

## 2022-02-25 PROCEDURE — G0157 HHC PT ASSISTANT EA 15: HCPCS

## 2022-03-03 ENCOUNTER — HOME CARE VISIT (OUTPATIENT)
Dept: SCHEDULING | Facility: HOME HEALTH | Age: 87
End: 2022-03-03
Payer: MEDICARE

## 2022-03-03 PROCEDURE — G0299 HHS/HOSPICE OF RN EA 15 MIN: HCPCS

## 2022-03-04 ENCOUNTER — HOME CARE VISIT (OUTPATIENT)
Dept: SCHEDULING | Facility: HOME HEALTH | Age: 87
End: 2022-03-04
Payer: MEDICARE

## 2022-03-04 VITALS
OXYGEN SATURATION: 95 % | TEMPERATURE: 99.9 F | DIASTOLIC BLOOD PRESSURE: 57 MMHG | HEART RATE: 96 BPM | RESPIRATION RATE: 22 BRPM | SYSTOLIC BLOOD PRESSURE: 153 MMHG

## 2022-03-04 VITALS
SYSTOLIC BLOOD PRESSURE: 138 MMHG | HEART RATE: 96 BPM | DIASTOLIC BLOOD PRESSURE: 60 MMHG | RESPIRATION RATE: 18 BRPM | TEMPERATURE: 98.4 F

## 2022-03-04 PROCEDURE — G0151 HHCP-SERV OF PT,EA 15 MIN: HCPCS

## 2022-03-04 NOTE — CASE COMMUNICATION
Discipline: Steve Norris, [address], 89y. o. female admitted to [address] [place] [address] for Pneumonia/ Bronchiectasis/ COPD. Assessment- Pt with increased shortness of breath, mildy febrile at 99.9, wheezing/ crackles auscultated throughout bilat lung fields. Reports that her O2 sats had been dropping down into the 70's each morning. O2 sats sustained at 95% on 2LPM upon as sessment. Pt became tachypneic with worsening dyspnea on exertion upon ambulation; O2 sats maintained above 93%. She reported insomnia, worsening swelling to bilateral lower extremeties, generally \"not feeling well\". SN instructed pt to take 2 puffs of her inhaler as her pulmonologist ordered. Determined that pt was not getting medication due to improper mechanics. Pt was instructed on how to properly take her albuterol. No wheezing aus cultated 30 mins after pt took her albuterol inhaler. Verbalized that the shortness of breath improved and she \"felt better\". However, continues with crackles noted throughout lung fields. Severe dependent +3 pitting edema noted to bilateral lower extremeties. Reports inability to put on her normal tennis shoes due to swelling. Currently taking 20mg of Ferosemide daily. Recommendation: Notified MD- Namrata Moss DO via [employer] at [phone] on 3/2/22.  called and notified pt that she will most likely be prescribed a higher dose of ferosemide with her prescription to be called in tomorrow (3/4/22) per MD and she will recive a call from Dr. Lala Deleon' office soon to schedule an appt for next week. She stated that her neice would be able to  her prescriptions for her.  SN instructed pt to call 911 if she develops: persistent severe shortness of breath that does  not resolve/improve to baseline with rest or the use of abuterol inhaler/ nebulizer, inability to ambulate due to shortness of breath, and/or shortness of breath that occurs with chest tightness/ heaviness or pain that spreads to neck/ jaw/ arm. Pt verbalized understanding with read back/ teach back/ and demonstration of albuteral inhaler use with proper mechanics.

## 2022-03-07 ENCOUNTER — HOME CARE VISIT (OUTPATIENT)
Dept: SCHEDULING | Facility: HOME HEALTH | Age: 87
End: 2022-03-07
Payer: MEDICARE

## 2022-03-07 PROCEDURE — G0299 HHS/HOSPICE OF RN EA 15 MIN: HCPCS

## 2022-03-08 ENCOUNTER — HOME CARE VISIT (OUTPATIENT)
Dept: HOME HEALTH SERVICES | Facility: HOME HEALTH | Age: 87
End: 2022-03-08
Payer: MEDICARE

## 2022-03-08 VITALS
HEART RATE: 101 BPM | OXYGEN SATURATION: 96 % | RESPIRATION RATE: 16 BRPM | TEMPERATURE: 99.3 F | DIASTOLIC BLOOD PRESSURE: 64 MMHG | SYSTOLIC BLOOD PRESSURE: 155 MMHG

## 2022-03-10 ENCOUNTER — HOME CARE VISIT (OUTPATIENT)
Dept: SCHEDULING | Facility: HOME HEALTH | Age: 87
End: 2022-03-10
Payer: MEDICARE

## 2022-03-10 ENCOUNTER — HOSPITAL ENCOUNTER (OUTPATIENT)
Dept: LAB | Age: 87
Discharge: HOME | End: 2022-03-10
Payer: MEDICARE

## 2022-03-10 LAB
ALBUMIN SERPL-MCNC: 3.2 G/DL (ref 3.2–4.6)
ALBUMIN/GLOB SERPL: 0.9 {RATIO} (ref 1.2–3.5)
ALP SERPL-CCNC: 64 U/L (ref 50–136)
ALT SERPL-CCNC: 19 U/L (ref 12–65)
ANION GAP SERPL CALC-SCNC: 3 MMOL/L (ref 7–16)
AST SERPL-CCNC: 28 U/L (ref 15–37)
BILIRUB SERPL-MCNC: 0.6 MG/DL (ref 0.2–1.1)
BUN SERPL-MCNC: 29 MG/DL (ref 8–23)
CALCIUM SERPL-MCNC: 9 MG/DL (ref 8.3–10.4)
CHLORIDE SERPL-SCNC: 96 MMOL/L (ref 98–107)
CO2 SERPL-SCNC: 35 MMOL/L (ref 21–32)
CREAT SERPL-MCNC: 1.5 MG/DL (ref 0.6–1)
ERYTHROCYTE [DISTWIDTH] IN BLOOD BY AUTOMATED COUNT: 15.7 % (ref 11.9–14.6)
GLOBULIN SER CALC-MCNC: 3.6 G/DL (ref 2.3–3.5)
GLUCOSE SERPL-MCNC: 80 MG/DL (ref 65–100)
HCT VFR BLD AUTO: 33 % (ref 35.8–46.3)
HGB BLD-MCNC: 10.3 G/DL (ref 11.7–15.4)
MCH RBC QN AUTO: 28.3 PG (ref 26.1–32.9)
MCHC RBC AUTO-ENTMCNC: 31.2 G/DL (ref 31.4–35)
MCV RBC AUTO: 90.7 FL (ref 79.6–97.8)
NRBC # BLD: 0 K/UL (ref 0–0.2)
PLATELET # BLD AUTO: 212 K/UL (ref 150–450)
PMV BLD AUTO: 8.4 FL (ref 9.4–12.3)
POTASSIUM SERPL-SCNC: 3.9 MMOL/L (ref 3.5–5.1)
PROT SERPL-MCNC: 6.8 G/DL (ref 6.3–8.2)
RBC # BLD AUTO: 3.64 M/UL (ref 4.05–5.2)
SODIUM SERPL-SCNC: 134 MMOL/L (ref 136–145)
WBC # BLD AUTO: 5.2 K/UL (ref 4.3–11.1)

## 2022-03-10 PROCEDURE — 80053 COMPREHEN METABOLIC PANEL: CPT

## 2022-03-10 PROCEDURE — 85027 COMPLETE CBC AUTOMATED: CPT

## 2022-03-10 PROCEDURE — G0299 HHS/HOSPICE OF RN EA 15 MIN: HCPCS

## 2022-03-12 VITALS
RESPIRATION RATE: 18 BRPM | SYSTOLIC BLOOD PRESSURE: 172 MMHG | OXYGEN SATURATION: 93 % | DIASTOLIC BLOOD PRESSURE: 62 MMHG | TEMPERATURE: 97.9 F | HEART RATE: 95 BPM

## 2022-03-17 ENCOUNTER — HOME CARE VISIT (OUTPATIENT)
Dept: SCHEDULING | Facility: HOME HEALTH | Age: 87
End: 2022-03-17
Payer: MEDICARE

## 2022-03-17 PROCEDURE — 400013 HH SOC

## 2022-03-17 PROCEDURE — G0299 HHS/HOSPICE OF RN EA 15 MIN: HCPCS

## 2022-03-18 VITALS
TEMPERATURE: 97.5 F | SYSTOLIC BLOOD PRESSURE: 118 MMHG | OXYGEN SATURATION: 99 % | DIASTOLIC BLOOD PRESSURE: 64 MMHG | RESPIRATION RATE: 16 BRPM | HEART RATE: 88 BPM

## 2022-03-18 PROBLEM — R60.0 LOWER EXTREMITY EDEMA: Status: ACTIVE | Noted: 2022-01-21

## 2022-03-18 PROBLEM — J44.1 COPD EXACERBATION (HCC): Status: ACTIVE | Noted: 2022-01-21

## 2022-03-19 PROBLEM — R91.1 PULMONARY NODULE: Status: ACTIVE | Noted: 2019-03-27

## 2022-03-19 PROBLEM — J18.9 COMMUNITY ACQUIRED PNEUMONIA: Status: ACTIVE | Noted: 2022-01-21

## 2022-03-19 PROBLEM — J47.9 BRONCHIECTASIS WITHOUT COMPLICATION (HCC): Status: ACTIVE | Noted: 2019-03-27

## 2022-03-19 PROBLEM — R05.3 CHRONIC COUGH: Status: ACTIVE | Noted: 2019-03-27

## 2022-03-20 PROBLEM — D64.9 ANEMIA: Status: ACTIVE | Noted: 2022-01-21

## 2022-03-24 ENCOUNTER — HOME CARE VISIT (OUTPATIENT)
Dept: SCHEDULING | Facility: HOME HEALTH | Age: 87
End: 2022-03-24
Payer: MEDICARE

## 2022-03-24 PROCEDURE — G0299 HHS/HOSPICE OF RN EA 15 MIN: HCPCS

## 2022-03-27 VITALS
HEART RATE: 83 BPM | OXYGEN SATURATION: 96 % | DIASTOLIC BLOOD PRESSURE: 60 MMHG | SYSTOLIC BLOOD PRESSURE: 160 MMHG | RESPIRATION RATE: 20 BRPM | TEMPERATURE: 99 F

## 2022-03-30 ENCOUNTER — NURSE TRIAGE (OUTPATIENT)
Dept: OTHER | Facility: CLINIC | Age: 87
End: 2022-03-30

## 2022-03-30 NOTE — TELEPHONE ENCOUNTER
Received call from [address] at [facility] with Red Flag Complaint. Limited triage due to caller not being with pt - se notes pt joined call     Subjective: Kady foster states \"her legs have been swollen for some time. Seen  [place] toes were black gave her med. Echo end of April. Ant went over there and she said legs were swollen worse, red. Toes swollen worse than normal. Blisters on legs\"     Current Symptoms: cristian luu report ant reported to her both legs swollen, red and blisters. Swelling from knee down. Unsure if equal swelling. Pt has not reported pain. Did conference pt into call. Pt denies worsening shortness of breath or chest pains. Pt reports equal swelling. Blisters on leg not draining that pt knows of . Pt reports its a sore on leg. Pt reports one leg blister right side. Pt hx COPD. Red leg with redness greater than 2 inches     Onset: today     Associated Symptoms: NA    Pain Severity:  No pain     Temperature: 98.3    What has been tried: elevated  Tries     LMP: NA Pregnant: NA    Recommended disposition: See HCP within 4 Hours (or PCP triage)    Care advice provided, patient verbalizes understanding; denies any other questions or concerns; instructed to call back for any new or worsening symptoms. Patient/caller agrees to proceed to nearest [facility]     Attention Provider: Thank you for allowing me to participate in the care of your patient. The patient was connected to triage in response to information provided to the [facility]. Please do not respond through this encounter as the response is not directed to a shared pool.       Reason for Disposition   [1] Red area or streak [2] large (> 2 in. or 5 cm)    Protocols used: LEG SWELLING AND EDEMA-ADULT-

## 2022-03-31 ENCOUNTER — HOME CARE VISIT (OUTPATIENT)
Dept: SCHEDULING | Facility: HOME HEALTH | Age: 87
End: 2022-03-31
Payer: MEDICARE

## 2022-03-31 VITALS
TEMPERATURE: 98.7 F | RESPIRATION RATE: 18 BRPM | DIASTOLIC BLOOD PRESSURE: 60 MMHG | OXYGEN SATURATION: 97 % | HEART RATE: 97 BPM | SYSTOLIC BLOOD PRESSURE: 140 MMHG

## 2022-03-31 PROCEDURE — G0299 HHS/HOSPICE OF RN EA 15 MIN: HCPCS

## 2022-04-07 ENCOUNTER — HOME CARE VISIT (OUTPATIENT)
Dept: SCHEDULING | Facility: HOME HEALTH | Age: 87
End: 2022-04-07
Payer: MEDICARE

## 2022-04-07 VITALS
DIASTOLIC BLOOD PRESSURE: 60 MMHG | RESPIRATION RATE: 16 BRPM | SYSTOLIC BLOOD PRESSURE: 136 MMHG | HEART RATE: 86 BPM | OXYGEN SATURATION: 98 % | TEMPERATURE: 98.5 F

## 2022-04-07 PROCEDURE — G0299 HHS/HOSPICE OF RN EA 15 MIN: HCPCS

## 2022-05-05 PROBLEM — I50.32 CHRONIC DIASTOLIC CONGESTIVE HEART FAILURE (HCC): Status: ACTIVE | Noted: 2022-05-05

## 2022-05-05 PROBLEM — I73.9 PAD (PERIPHERAL ARTERY DISEASE) (HCC): Status: ACTIVE | Noted: 2022-05-05

## 2022-05-05 PROBLEM — Z99.81 CHRONIC RESPIRATORY FAILURE WITH HYPOXIA, ON HOME OXYGEN THERAPY (HCC): Status: ACTIVE | Noted: 2022-05-05

## 2022-05-05 PROBLEM — J96.11 CHRONIC RESPIRATORY FAILURE WITH HYPOXIA, ON HOME OXYGEN THERAPY (HCC): Status: ACTIVE | Noted: 2022-05-05

## 2022-05-05 PROBLEM — I27.20 PULMONARY HTN (HCC): Status: ACTIVE | Noted: 2022-05-05

## 2022-05-05 PROBLEM — I50.32 CHRONIC DIASTOLIC CONGESTIVE HEART FAILURE (HCC): Status: ACTIVE | Noted: 2022-01-01

## 2022-05-05 PROBLEM — N18.30 STAGE 3 CHRONIC KIDNEY DISEASE (HCC): Status: ACTIVE | Noted: 2022-05-05

## 2022-05-05 PROBLEM — I10 PRIMARY HYPERTENSION: Status: ACTIVE | Noted: 2022-01-01

## 2022-05-05 PROBLEM — I10 PRIMARY HYPERTENSION: Status: ACTIVE | Noted: 2022-05-05

## 2022-05-13 DIAGNOSIS — E03.4 HYPOTHYROIDISM DUE TO ACQUIRED ATROPHY OF THYROID: Primary | ICD-10-CM

## 2022-05-13 DIAGNOSIS — Z00.00 ROUTINE GENERAL MEDICAL EXAMINATION AT A HEALTH CARE FACILITY: ICD-10-CM

## 2022-05-23 NOTE — PROGRESS NOTES
[address] [facility], [address] [facility]  [phone]        Name:  Trey Morton  YOB: 1931  MRN:  [id number]    Office visit  5/24/2022      Chief Complaint   Patient presents with    Other     Bronchiectasis           HISTORY OF PRESENT ILLNESS:  The patient is a 80year old female who is seen for follow up of bronchiectasis. She is accompanied by her niece. She was hospitalized 1/21/22 thru 1/24/22 with CAP. She continues to wear her O2 close to 24/7. She will occasionally take the oxygen off when she is sitting. She has noticed O2 sats as low as 77% without her O2. Denies excessive coughing. No wheezing. Remains on nebulized albuterol 3-4 times daily. Has had ongoing problems with LE edema. Is seeing cardiology and is on Lasix. ASSESSMENT/PLAN:  (Medical Decision Making)  Below is the assessment and plan developed based on review of pertinent history, physical exam, labs, studies, and medications. Encounter Diagnoses   Name Primary?  Bronchiectasis without complication ([facility]  --continue nebulized albuterol 3-4 times daily. Yes    Community acquired pneumonia of right lung, unspecified part of lung  --follow up is needed. --CXR today.  Hypoxemia  --O2 sat 91% on room air at rest.  May D/C resting O2.  --continue O2 at 2 lpm with exertion and sleep. Orders Placed This Encounter   Procedures    XR CHEST (2 VIEWS)     Standing Status:   Future     Standing Expiration Date:   5/24/2023      No orders of the defined types were placed in this encounter. Follow up with us in 6 months. Collaborating physician is Dr. Carl Lacy.     MM         Agatha Smith, TOMMY, APRN - CNP  Electronically signed          ~~~~~~~~~~~~~~~~~~~~~~~~~~~~~~~~~~~~~~~~  REFERENCE INFORMATION    Past Medical History:   Diagnosis Date    Allergic rhinitis     COPD (chronic obstructive pulmonary disease) [facility]     Gastroesophageal reflux     Heel spur     treated with excision    History of bilateral cataract extraction     History of hemorrhoids     History of total hysterectomy     Hyperlipidemia     Hypothyroidism     Idiopathic scoliosis     Insomnia     Mammogram not needed 01/2012    wnl per pt, no longer needs d/t age       Past Surgical History:   Procedure Laterality Date    CATARACT REMOVAL  1996,2000    HEMORRHOID SURGERY      ORTHOPEDIC SURGERY  1992    HEEL SPUR     CHAGO AND BSO  1979       Family History   Problem Relation Age of Onset    Tuberculosis Father     Diabetes Brother     Heart Disease Mother         leaky valve       Social History     Socioeconomic History    Marital status:      Spouse name: Not on file    Number of children: Not on file    Years of education: Not on file    Highest education level: Not on file   Occupational History    Not on file   Tobacco Use    Smoking status: Passive Smoke Exposure - Never Smoker    Smokeless tobacco: Never Used    Tobacco comment: Quit smoking: pt's  is former smoker   Substance and Sexual Activity    Alcohol use: No     Alcohol/week: 0.0 standard drinks    Drug use: Not on file    Sexual activity: Not on file   Other Topics Concern    Not on file   Social History Narrative    Not on file     Social Determinants of Health     Financial Resource Strain:     Difficulty of Paying Living Expenses: Not on file   Food Insecurity:     Worried About [address] in the Last Year: Not on file    Bindu of Food in the Last Year: Not on file   Transportation Needs:     Lack of Transportation (Medical): Not on file    Lack of Transportation (Non-Medical):  Not on file   Physical Activity:     Days of Exercise per Week: Not on file    Minutes of Exercise per Session: Not on file   Stress:     Feeling of Stress : Not on file   Social Connections:     Frequency of Communication with Friends and Family: Not on file    Frequency of Social Gatherings with Friends and Family: Not on file    Attends Restorationism Services: Not on file    Active Member of Clubs or Organizations: Not on file    Attends Club or Organization Meetings: Not on file    Marital Status: Not on file   Intimate Partner Violence:     Fear of Current or Ex-Partner: Not on file    Emotionally Abused: Not on file    Physically Abused: Not on file    Sexually Abused: Not on file   Housing Stability:     Unable to Pay for Housing in the Last Year: Not on file    Number of [place] in the Last Year: Not on file    Unstable Housing in the Last Year: Not on file       Patient Active Problem List   Diagnosis    Lower extremity edema    COPD exacerbation ([facility]    Insomnia    Hyperlipidemia    Scoliosis    Gastroesophageal reflux    Bronchiectasis without complication ([facility]    Allergic rhinitis    Chronic cough    Pulmonary nodule    [facility]) [facility]    Hypothyroidism due to acquired atrophy of thyroid    Community acquired pneumonia    Anemia    Primary hypertension    Stage 3 chronic kidney disease ([facility]    PAD (peripheral artery disease) ([facility]    Chronic respiratory failure with hypoxia, on home oxygen therapy ([facility]    Chronic diastolic congestive heart failure ([facility]    Pulmonary HTN ([facility]    Chronic renal disease, stage III (HCC) [834356]    Chronic systolic (congestive) heart failure          Allergies   Allergen Reactions    Clarithromycin Rash       Current Outpatient Medications   Medication Sig    carvedilol (COREG) 6.25 MG tablet Take 6.25 mg by mouth 2 times daily (with meals)    NONFORMULARY Mucus clearing device, flutter valve    Omega-3 1000 MG CAPS Take by mouth    folic acid (FOLVITE) 1 MG tablet Take 1 mg by mouth daily    Coenzyme Q10 (CO Q-10) 100 MG CAPS Take by mouth    calcium carbonate 600 MG TABS tablet Take 1 tablet by mouth daily    Ascorbic Acid (VITAMIN C) 250 MG tablet Take 250 mg by mouth daily    BIOTIN PO Take 1 tablet by mouth daily    albuterol sulfate  (90 Base) MCG/ACT inhaler Inhale 2 puffs into the lungs every 4 hours as needed    albuterol (PROVENTIL) (2.5 MG/3ML) 0.083% nebulizer solution USE 1 VIAL BY NEBULIZER EVERY 4 HOURS AS NEEDED FOR WHEEZING.  amLODIPine (NORVASC) 5 MG tablet Take 5 mg by mouth daily    aspirin 81 MG EC tablet Take 81 mg by mouth daily    cilostazol (PLETAL) 100 MG tablet Take 100 mg by mouth 2 times daily (before meals)    dextromethorphan (DELSYM) 30 MG/5ML extended release liquid Take 60 mg by mouth    ferrous sulfate (IRON 325) 325 (65 Fe) MG tablet Take 325 mg by mouth daily (with breakfast)    fluticasone (FLONASE) 50 MCG/ACT nasal spray 1 spray by Nasal route daily    furosemide (LASIX) 20 MG tablet Take 20 mg by mouth 2 times daily    guaiFENesin (MUCINEX) 600 MG extended release tablet Take 1,200 mg by mouth as needed    levothyroxine (SYNTHROID) 75 MCG tablet Take 75 mcg by mouth every morning (before breakfast)    Lutein 20 MG CAPS Take 1 tablet by mouth daily    Magnesium 500 MG CAPS Take 1 tablet by mouth daily    montelukast (SINGULAIR) 10 MG tablet TAKE 1 TABLET BY MOUTH DAILY    omeprazole (PRILOSEC) 20 MG delayed release capsule Take 20 mg by mouth daily    raloxifene (EVISTA) 60 MG tablet Take 60 mg by mouth daily    triamcinolone (KENALOG) 0.1 % cream The details of the medication are not available because there are pending changes by a home health clinician. No current facility-administered medications for this visit. Review of Systems   Constitutional: Negative for chills, diaphoresis, fatigue and fever. Cardiovascular: Negative for chest pain, palpitations and leg swelling. Gastrointestinal: Negative for abdominal pain, constipation, diarrhea, nausea and vomiting. Neurological: Negative for dizziness, tremors, seizures, syncope, weakness and headaches.                PHYSICAL EXAM:    Vitals: 05/24/22 1132   BP: (!) 158/55   Pulse: 80   Resp: 17   Temp: 97.2 °F (36.2 °C)   TempSrc: Temporal   SpO2: 97%  Comment: 2 LPM   Weight: 115 lb 11.2 oz (52.5 kg)   Height: 5' 2.5\" (1.588 m)      Body mass index is 20.82 kg/m². GENERAL APPEARANCE:  The patient is normal weight and in no respiratory distress. HEENT:  PERRL. Conjunctivae unremarkable. Nasal mucosa is without epistaxis, exudate, or polyps. Gums and dentition are unremarkable. There is no oropharyngeal narrowing. TMs are clear. NECK/LYMPHATIC:  Symmetrical with no elevation of jugular venous pulsation. Trachea midline. No thyroid enlargement. No cervical adenopathy. LUNGS:  Normal respiratory effort with symmetrical lung expansion. Breath sounds with a few bilateral crackles/rhonchi. Benito Wing HEART:  There is a regular rate and rhythm. No murmur, rub, or gallop. There is no edema in the lower extremities. ABDOMEN:  Soft and non-tender. No hepatosplenomegaly. Bowel sounds are normal.    NEURO:  The patient is alert and oriented to person, place, and time. Memory appears intact and mood is normal.  No gross sensorimotor deficits are present. DIAGNOSTIC TESTS:   CXR --1/23/22                 Spirometry/Complete pulmonary function tests: 3/27/19--                 Yury Lynn NP, APRN - CNP  Electronically signed    Dictated using voice recognition software.   Proof read but unrecognized errors may exist.

## 2022-05-24 ENCOUNTER — HOSPITAL ENCOUNTER (OUTPATIENT)
Dept: GENERAL RADIOLOGY | Age: 87
Discharge: HOME | End: 2022-05-27
Payer: MEDICARE

## 2022-05-24 ENCOUNTER — OFFICE VISIT (OUTPATIENT)
Dept: PULMONOLOGY | Age: 87
End: 2022-05-24
Payer: MEDICARE

## 2022-05-24 VITALS
DIASTOLIC BLOOD PRESSURE: 55 MMHG | BODY MASS INDEX: 20.5 KG/M2 | SYSTOLIC BLOOD PRESSURE: 158 MMHG | RESPIRATION RATE: 17 BRPM | HEART RATE: 80 BPM | WEIGHT: 115.7 LBS | OXYGEN SATURATION: 97 % | TEMPERATURE: 97.2 F | HEIGHT: 63 IN

## 2022-05-24 DIAGNOSIS — J18.9 COMMUNITY ACQUIRED PNEUMONIA OF RIGHT LUNG, UNSPECIFIED PART OF LUNG: ICD-10-CM

## 2022-05-24 DIAGNOSIS — J47.9 BRONCHIECTASIS WITHOUT COMPLICATION (HCC): Primary | ICD-10-CM

## 2022-05-24 DIAGNOSIS — R09.02 HYPOXEMIA: ICD-10-CM

## 2022-05-24 PROBLEM — N18.30 CHRONIC RENAL DISEASE, STAGE III (HCC): Status: ACTIVE | Noted: 2022-05-24

## 2022-05-24 PROBLEM — I50.22 CHRONIC SYSTOLIC (CONGESTIVE) HEART FAILURE (HCC): Status: ACTIVE | Noted: 2022-05-24

## 2022-05-24 PROCEDURE — 71046 X-RAY EXAM CHEST 2 VIEWS: CPT

## 2022-05-24 PROCEDURE — 1123F ACP DISCUSS/DSCN MKR DOCD: CPT | Performed by: NURSE PRACTITIONER

## 2022-05-24 PROCEDURE — 1036F TOBACCO NON-USER: CPT | Performed by: NURSE PRACTITIONER

## 2022-05-24 PROCEDURE — 1090F PRES/ABSN URINE INCON ASSESS: CPT | Performed by: NURSE PRACTITIONER

## 2022-05-24 PROCEDURE — 99214 OFFICE O/P EST MOD 30 MIN: CPT | Performed by: NURSE PRACTITIONER

## 2022-05-24 PROCEDURE — G8420 CALC BMI NORM PARAMETERS: HCPCS | Performed by: NURSE PRACTITIONER

## 2022-05-24 PROCEDURE — G8427 DOCREV CUR MEDS BY ELIG CLIN: HCPCS | Performed by: NURSE PRACTITIONER

## 2022-05-24 RX ORDER — PHENOL 1.4 %
1 AEROSOL, SPRAY (ML) MUCOUS MEMBRANE DAILY
COMMUNITY

## 2022-05-24 RX ORDER — CARVEDILOL 6.25 MG/1
6.25 TABLET ORAL 2 TIMES DAILY WITH MEALS
COMMUNITY

## 2022-05-24 RX ORDER — CHLORAL HYDRATE 500 MG
CAPSULE ORAL
COMMUNITY

## 2022-05-24 RX ORDER — MULTIVIT WITH MINERALS/LUTEIN
250 TABLET ORAL DAILY
COMMUNITY

## 2022-05-24 RX ORDER — DIMENHYDRINATE 50 MG
TABLET ORAL
COMMUNITY

## 2022-05-24 RX ORDER — FOLIC ACID 1 MG/1
1 TABLET ORAL DAILY
COMMUNITY

## 2022-05-24 ASSESSMENT — ENCOUNTER SYMPTOMS
ABDOMINAL PAIN: 0
DIARRHEA: 0
NAUSEA: 0
CONSTIPATION: 0
VOMITING: 0

## 2022-06-02 ENCOUNTER — TELEPHONE (OUTPATIENT)
Dept: PULMONOLOGY | Age: 87
End: 2022-06-02

## 2022-06-02 DIAGNOSIS — R05.3 CHRONIC COUGH: Primary | ICD-10-CM

## 2022-06-03 RX ORDER — HYDROCODONE BITARTRATE AND HOMATROPINE METHYLBROMIDE ORAL SOLUTION 5; 1.5 MG/5ML; MG/5ML
5 LIQUID ORAL 4 TIMES DAILY PRN
Qty: 240 ML | Refills: 0 | Status: SHIPPED | OUTPATIENT
Start: 2022-06-03 | End: 2022-07-03

## 2022-06-03 NOTE — TELEPHONE ENCOUNTER
Patient requesting refill of hydromet; last fill was 4/5/22; verified on  [facility].     Last seen: 5/24/22  Hx: bronchiectasis

## 2022-06-06 ENCOUNTER — OFFICE VISIT (OUTPATIENT)
Dept: CARDIOLOGY CLINIC | Age: 87
End: 2022-06-06
Payer: MEDICARE

## 2022-06-06 VITALS
BODY MASS INDEX: 20.2 KG/M2 | HEART RATE: 100 BPM | DIASTOLIC BLOOD PRESSURE: 32 MMHG | WEIGHT: 114 LBS | SYSTOLIC BLOOD PRESSURE: 142 MMHG | HEIGHT: 63 IN

## 2022-06-06 DIAGNOSIS — I50.32 CHRONIC DIASTOLIC CONGESTIVE HEART FAILURE (HCC): Primary | ICD-10-CM

## 2022-06-06 DIAGNOSIS — N18.32 STAGE 3B CHRONIC KIDNEY DISEASE (HCC): ICD-10-CM

## 2022-06-06 DIAGNOSIS — I25.119 ATHEROSCLEROSIS OF NATIVE CORONARY ARTERY OF NATIVE HEART WITH ANGINA PECTORIS (HCC): ICD-10-CM

## 2022-06-06 DIAGNOSIS — I27.20 PULMONARY HTN (HCC): ICD-10-CM

## 2022-06-06 DIAGNOSIS — I10 PRIMARY HYPERTENSION: ICD-10-CM

## 2022-06-06 DIAGNOSIS — I73.9 PAD (PERIPHERAL ARTERY DISEASE) (HCC): ICD-10-CM

## 2022-06-06 DIAGNOSIS — I20.9 ANGINA PECTORIS, UNSPECIFIED (HCC): ICD-10-CM

## 2022-06-06 PROCEDURE — 99214 OFFICE O/P EST MOD 30 MIN: CPT | Performed by: INTERNAL MEDICINE

## 2022-06-06 PROCEDURE — 1123F ACP DISCUSS/DSCN MKR DOCD: CPT | Performed by: INTERNAL MEDICINE

## 2022-06-06 PROCEDURE — G8420 CALC BMI NORM PARAMETERS: HCPCS | Performed by: INTERNAL MEDICINE

## 2022-06-06 PROCEDURE — 1090F PRES/ABSN URINE INCON ASSESS: CPT | Performed by: INTERNAL MEDICINE

## 2022-06-06 PROCEDURE — G8428 CUR MEDS NOT DOCUMENT: HCPCS | Performed by: INTERNAL MEDICINE

## 2022-06-06 PROCEDURE — 1036F TOBACCO NON-USER: CPT | Performed by: INTERNAL MEDICINE

## 2022-06-06 RX ORDER — ISOSORBIDE MONONITRATE 30 MG/1
30 TABLET, EXTENDED RELEASE ORAL DAILY
Qty: 30 TABLET | Refills: 3 | Status: SHIPPED | OUTPATIENT
Start: 2022-06-06 | End: 2022-11-03

## 2022-06-06 ASSESSMENT — ENCOUNTER SYMPTOMS
SHORTNESS OF BREATH: 1
SPUTUM PRODUCTION: 1
ABDOMINAL PAIN: 0
COUGH: 1

## 2022-06-06 NOTE — PROGRESS NOTES
[address], [address] [facility], [address] [place] [address]  PHONE: [phone]    Fran Jauregui  11/6/1931      SUBJECTIVE:   Farn Jauregui is a 80 y.o. female seen for a follow up visit regarding the following:     Chief Complaint   Patient presents with    Follow-up     1 mth   edema/HTN        HPI:    77-year-old female comes back for follow-up of her history of chronic swelling the lower extremities she has had chronic lung disease and oxygen now. She had secondhand smoke plus bronchiectasis. She has a lot of coughing sputum's have difficulty sleeping at night. He had previous echo showing more hyperdynamic LV function probably diastolic dysfunction. She has aortic valve sclerosis mitral annular calcification. She is overall been stable since I last saw her her swelling is stable but she does elevate her feet as much. She has not been taking the Lasix twice a day every day. Have chronic renal insufficiency creatinine runs about 1.5-1.9 time she has had some occasional chest discomfort which is vague and. Past Medical History, Past Surgical History, Family history, Social History, and Medications were all reviewed with the patient today and updated as necessary.        Allergies   Allergen Reactions    Clarithromycin Rash     Past Medical History:   Diagnosis Date    Allergic rhinitis     COPD (chronic obstructive pulmonary disease) (HCC)     Gastroesophageal reflux     Heel spur     treated with excision    History of bilateral cataract extraction     History of hemorrhoids     History of total hysterectomy     Hyperlipidemia     Hypothyroidism     Idiopathic scoliosis     Insomnia     Mammogram not needed 01/2012    wnl per pt, no longer needs d/t age     Past Surgical History:   Procedure Laterality Date    CATARACT REMOVAL  1996,2000    HEMORRHOID SURGERY      ORTHOPEDIC SURGERY  1992    HEEL SPUR     CHAGO AND CROW  1979     Family History   Problem Relation Age of Onset    Tuberculosis Father     Diabetes Brother     Heart Disease Mother         leaky valve      Social History     Tobacco Use    Smoking status: Passive Smoke Exposure - Never Smoker    Smokeless tobacco: Never Used    Tobacco comment: Quit smoking: pt's  is former smoker   Substance Use Topics    Alcohol use: No     Alcohol/week: 0.0 standard drinks       ROS:    Review of Systems   Constitutional: Negative for decreased appetite. Cardiovascular: Positive for chest pain and dyspnea on exertion. Negative for palpitations and syncope. Respiratory: Positive for cough, shortness of breath and sputum production. Hematologic/Lymphatic: Negative for bleeding problem. Gastrointestinal: Negative for abdominal pain. Neurological: Negative. PHYSICAL EXAM:    BP (!) 142/32   Pulse 100   Ht 5' 2.5\" (1.588 m)   Wt 114 lb (51.7 kg)   BMI 20.52 kg/m²        Wt Readings from Last 3 Encounters:   06/06/22 114 lb (51.7 kg)   05/24/22 115 lb 11.2 oz (52.5 kg)   05/13/22 110 lb (49.9 kg)     BP Readings from Last 3 Encounters:   06/06/22 (!) 142/32   05/24/22 (!) 158/55   05/13/22 (!) 140/60         Physical Exam  Constitutional:       General: She is not in acute distress. Cardiovascular:      Rate and Rhythm: Normal rate. Heart sounds: No murmur heard. No gallop. Pulmonary:      Effort: Pulmonary effort is normal.      Breath sounds: No rales. Musculoskeletal:         General: Swelling present. Skin:     General: Skin is warm. Neurological:      Mental Status: She is alert. Medical problems and test results were reviewed with the patient today. No results found for any visits on 06/06/22.   Lab Results   Component Value Date     04/27/2022    K 3.9 04/27/2022    CL 94 04/27/2022    CO2 32 04/27/2022    BUN 27 04/27/2022    GFRAA 42 03/10/2022     Lab Results   Component Value Date    CHOL 235 04/27/2022    HDL 88 04/27/2022    VLDL 12 04/27/2022         ASSESSMENT and GERTRUDE Johnsonlizzy Deshpande was seen today for follow-up. Diagnoses and all orders for this visit:    Chronic diastolic congestive heart failure (HCC) chronic diastolic failure chronic swelling she needs to go back on the Lasix twice a day see if that will help bring up fluid down    Angina pectoris, unspecified he has some occasional angina    Atherosclerosis of native coronary artery of native heart with angina pectoris ([address] she has had some chest discomfort at times I will give her some Imdur to take    Stage 3b chronic kidney disease ([address] creatinine runs around 1.5    PAD (peripheral artery disease) ([address] study suggesting peripheral vascular disease on Pletal continue that it seems to have helped    Primary hypertensionOverall stable we will continue her Coreg low-dose along with her amlodipine 5    Pulmonary HTN (HCC)Has had some elevated pulmonary pressures chronic lung disease. Other orders  -     isosorbide mononitrate (IMDUR) 30 MG extended release tablet; Take 1 tablet by mouth daily        [unfilled]      No follow-up provider specified.     Pietro De Guzman MD  6/6/2022  5:37 PM

## 2022-07-06 RX ORDER — CILOSTAZOL 100 MG/1
TABLET ORAL
Qty: 60 TABLET | Refills: 11 | Status: SHIPPED | OUTPATIENT
Start: 2022-07-06

## 2022-07-12 ENCOUNTER — TELEPHONE (OUTPATIENT)
Dept: PULMONOLOGY | Age: 87
End: 2022-07-12

## 2022-07-12 DIAGNOSIS — R05.3 CHRONIC COUGH: ICD-10-CM

## 2022-07-12 DIAGNOSIS — J47.9 BRONCHIECTASIS WITHOUT COMPLICATION (HCC): Primary | ICD-10-CM

## 2022-07-12 RX ORDER — HYDROCODONE BITARTRATE AND HOMATROPINE METHYLBROMIDE ORAL SOLUTION 5; 1.5 MG/5ML; MG/5ML
2.5 LIQUID ORAL 4 TIMES DAILY PRN
Qty: 240 ML | Refills: 0 | Status: SHIPPED | OUTPATIENT
Start: 2022-07-12 | End: 2022-08-11

## 2022-07-12 NOTE — TELEPHONE ENCOUNTER
Patient called in requesting a refill for Hydromet, please advise.      Last seen: 05/24/2022   Hx: Bronchiectasis

## 2022-08-24 ENCOUNTER — TELEPHONE (OUTPATIENT)
Dept: PULMONOLOGY | Age: 87
End: 2022-08-24

## 2022-08-24 ENCOUNTER — TELEPHONE (OUTPATIENT)
Dept: SLEEP MEDICINE | Age: 87
End: 2022-08-24

## 2022-08-24 DIAGNOSIS — J47.9 BRONCHIECTASIS WITHOUT COMPLICATION (HCC): Primary | ICD-10-CM

## 2022-08-24 DIAGNOSIS — R05.3 CHRONIC COUGH: ICD-10-CM

## 2022-08-24 RX ORDER — HYDROCODONE BITARTRATE AND HOMATROPINE METHYLBROMIDE ORAL SOLUTION 5; 1.5 MG/5ML; MG/5ML
5 LIQUID ORAL 4 TIMES DAILY PRN
Qty: 240 ML | Refills: 0 | Status: SHIPPED | OUTPATIENT
Start: 2022-08-24 | End: 2022-09-23

## 2022-08-24 NOTE — TELEPHONE ENCOUNTER
Dr. Arnel Valadez pt requested refill for the Hydromet cough syrup. Last fill on 7/11/22, for 240ML. Pt is requesting refills again. Please advise.  Jarocho Roca MA

## 2022-08-24 NOTE — TELEPHONE ENCOUNTER
Patient needs refill on Hydromet cough syrup. Please send to [facility] [address]  at [place]. Patient can be reached at [phone].

## 2022-09-08 ENCOUNTER — OFFICE VISIT (OUTPATIENT)
Dept: CARDIOLOGY CLINIC | Age: 87
End: 2022-09-08
Payer: MEDICARE

## 2022-09-08 VITALS
HEART RATE: 88 BPM | BODY MASS INDEX: 20.52 KG/M2 | HEIGHT: 63 IN | DIASTOLIC BLOOD PRESSURE: 54 MMHG | SYSTOLIC BLOOD PRESSURE: 138 MMHG

## 2022-09-08 DIAGNOSIS — Z99.81 CHRONIC RESPIRATORY FAILURE WITH HYPOXIA, ON HOME OXYGEN THERAPY (HCC): ICD-10-CM

## 2022-09-08 DIAGNOSIS — I27.20 PULMONARY HTN (HCC): ICD-10-CM

## 2022-09-08 DIAGNOSIS — R60.0 LOWER EXTREMITY EDEMA: ICD-10-CM

## 2022-09-08 DIAGNOSIS — N18.32 STAGE 3B CHRONIC KIDNEY DISEASE (HCC): ICD-10-CM

## 2022-09-08 DIAGNOSIS — I10 PRIMARY HYPERTENSION: ICD-10-CM

## 2022-09-08 DIAGNOSIS — I50.32 CHRONIC DIASTOLIC CONGESTIVE HEART FAILURE (HCC): ICD-10-CM

## 2022-09-08 DIAGNOSIS — J96.11 CHRONIC RESPIRATORY FAILURE WITH HYPOXIA, ON HOME OXYGEN THERAPY (HCC): ICD-10-CM

## 2022-09-08 DIAGNOSIS — I25.119 ATHEROSCLEROSIS OF NATIVE CORONARY ARTERY OF NATIVE HEART WITH ANGINA PECTORIS (HCC): Primary | ICD-10-CM

## 2022-09-08 DIAGNOSIS — I50.22 CHRONIC SYSTOLIC (CONGESTIVE) HEART FAILURE (HCC): ICD-10-CM

## 2022-09-08 PROCEDURE — G8420 CALC BMI NORM PARAMETERS: HCPCS | Performed by: INTERNAL MEDICINE

## 2022-09-08 PROCEDURE — 99214 OFFICE O/P EST MOD 30 MIN: CPT | Performed by: INTERNAL MEDICINE

## 2022-09-08 PROCEDURE — G8427 DOCREV CUR MEDS BY ELIG CLIN: HCPCS | Performed by: INTERNAL MEDICINE

## 2022-09-08 PROCEDURE — 1036F TOBACCO NON-USER: CPT | Performed by: INTERNAL MEDICINE

## 2022-09-08 PROCEDURE — 1123F ACP DISCUSS/DSCN MKR DOCD: CPT | Performed by: INTERNAL MEDICINE

## 2022-09-08 PROCEDURE — 1090F PRES/ABSN URINE INCON ASSESS: CPT | Performed by: INTERNAL MEDICINE

## 2022-09-08 RX ORDER — AMLODIPINE BESYLATE 10 MG/1
10 TABLET ORAL DAILY
Qty: 30 TABLET | Refills: 5 | Status: SHIPPED | OUTPATIENT
Start: 2022-09-08

## 2022-09-08 ASSESSMENT — ENCOUNTER SYMPTOMS
ORTHOPNEA: 0
SHORTNESS OF BREATH: 1
ABDOMINAL PAIN: 0

## 2022-09-08 NOTE — PROGRESS NOTES
exposure: Yes    Smokeless tobacco: Never    Tobacco comments:     Quit smoking: pt's  is former smoker   Substance Use Topics    Alcohol use: No     Alcohol/week: 0.0 standard drinks       ROS:    Review of Systems   Constitutional: Negative for decreased appetite. Cardiovascular:  Positive for dyspnea on exertion. Negative for chest pain and orthopnea. Respiratory:  Positive for shortness of breath. Gastrointestinal:  Negative for abdominal pain. Neurological: Negative. PHYSICAL EXAM:    BP (!) 138/54   Pulse 88   Ht 5' 2.5\" (1.588 m)   BMI 20.52 kg/m²        Wt Readings from Last 3 Encounters:   06/06/22 114 lb (51.7 kg)   05/24/22 115 lb 11.2 oz (52.5 kg)   05/13/22 110 lb (49.9 kg)     BP Readings from Last 3 Encounters:   09/08/22 (!) 138/54   06/06/22 (!) 142/32   05/24/22 (!) 158/55         Physical Exam  Constitutional:       General: She is not in acute distress. Cardiovascular:      Rate and Rhythm: Normal rate. Heart sounds: Murmur heard. Pulmonary:      Effort: Pulmonary effort is normal.      Breath sounds: No rales. Musculoskeletal:         General: Swelling present. Neurological:      Mental Status: She is alert. Medical problems and test results were reviewed with the patient today. No results found for any visits on 09/08/22. Lab Results   Component Value Date/Time     04/27/2022 10:53 AM    K 3.9 04/27/2022 10:53 AM    CL 94 04/27/2022 10:53 AM    CO2 32 04/27/2022 10:53 AM    BUN 27 04/27/2022 10:53 AM    GFRAA 42 03/10/2022 11:30 AM     Lab Results   Component Value Date/Time    CHOL 235 04/27/2022 10:53 AM    HDL 88 04/27/2022 10:53 AM    VLDL 12 04/27/2022 10:53 AM   Echocardiogram April 29 showed hyperdynamic LV function EF is over 84% mild diastolic dysfunction mild sclerosis of the aortic valve with mild to moderate regurgitation.   Moderate elevated RV pressure with TR      ASSESSMENT and PLAN    Elex Standard was seen today for coronary artery disease. Diagnoses and all orders for this visit:    Atherosclerosis of native coronary artery of native heart with angina pectoris ([address] no angina noted certainly medical treatment is her best option. Pulmonary HTN ([address] she has moderate pulmonary hypertension she had chronic severe respiratory failure hypoxia on oxygen. Primary hypertension blood pressure repeat is high I will increase the Norvasc although it may lead to swelling but should not affect her kidney function significantly    Chronic diastolic congestive heart failure ([address] she does have some suggestive diastolic dysfunction although some of this is more of right heart with pulmonary hypertension she has excellent systolic function on r    Chronic respiratory failure with hypoxia, on home oxygen therapy ([address] patient is a chronic lung disease on chronic oxygen unchanged    Stage 3b chronic kidney disease (HCC) creatinine is 1.49 fairly stable    Lower extremity edema she needs to stay on her diuretic needs to elevate her feet more often that will help bring the swelling down. Other orders  -     amLODIPine (NORVASC) 10 MG tablet; Take 1 tablet by mouth daily      [unfilled]      No follow-up provider specified.     Eliane Lennox, MD  9/8/2022  10:21 PM

## 2022-10-12 DIAGNOSIS — R05.3 CHRONIC COUGH: Primary | ICD-10-CM

## 2022-10-17 NOTE — TELEPHONE ENCOUNTER
Patient is out of this medications and has been trying to get refills for several days . Pharmacy says they do not have it yet .  Please speak with Dr. Jeanine Gu and ask her to sign it         prescription for  Hydromet

## 2022-10-18 RX ORDER — HYDROCODONE BITARTRATE AND HOMATROPINE METHYLBROMIDE ORAL SOLUTION 5; 1.5 MG/5ML; MG/5ML
5 LIQUID ORAL 4 TIMES DAILY PRN
Qty: 240 ML | Refills: 0 | Status: SHIPPED | OUTPATIENT
Start: 2022-10-18 | End: 2022-11-17

## 2022-11-03 RX ORDER — ISOSORBIDE MONONITRATE 30 MG/1
30 TABLET, EXTENDED RELEASE ORAL DAILY
Qty: 90 TABLET | Refills: 3 | Status: SHIPPED | OUTPATIENT
Start: 2022-11-03

## 2022-11-03 NOTE — TELEPHONE ENCOUNTER
Requested Prescriptions     Pending Prescriptions Disp Refills    isosorbide mononitrate (IMDUR) 30 MG extended release tablet [Pharmacy Med Name: ISOSORBIDE MONONITRATE 30MG ER TABS] 30 tablet 3     Sig: TAKE 1 TABLET BY MOUTH DAILY    Send to Mrs Victorine Sacks to sign.

## 2022-11-27 NOTE — PROGRESS NOTES
Patient Name:  Kenyatta Henry                             YOB: 1931  MRN: [id number]                                              Office Visit 11/28/2022    ASSESSMENT AND PLAN:  (Medical Decision Making)        Salty Conley was seen today for cough. Diagnoses and all orders for this visit:    Chronic cough  --treating symptomatically with Hycodan. I have reviewed the patient's controlled substance prescription history, as maintained in the [facility], so that the prescription for a controlled substance can be given. -     HYDROcodone homatropine (HYCODAN) 5-1.5 MG/5ML solution; Take 5 mLs by mouth 4 times daily as needed (severe cough) for up to 30 days. Bronchiectasis without complication ([address]  --continue albuterol bid-qid. Chronic respiratory failure with hypoxia, on home oxygen therapy (HCC)  --continue O2 at 2 lpm    Pulmonary hypertension (HCC)  --moderately elevated pressures in the 50s. This is multi-factorial related to chronic hypoxemia, chronic DHF. Continue Lasix 20 mg bid per cardiology. Orders Placed This Encounter   Medications    HYDROcodone homatropine (HYCODAN) 5-1.5 MG/5ML solution     Sig: Take 5 mLs by mouth 4 times daily as needed (severe cough) for up to 30 days. Dispense:  240 mL     Refill:  0     Palliative care patient       No orders of the defined types were placed in this encounter. Follow-up and Dispositions    Return in about 6 months (around 5/28/2023) for //COPD, pHTN. Collaborating physician is Dr. Heydi Deng. TANESHA Crouch, TOMMY, APRN - CNP      _________________________________________________________________________    HISTORY OF PRESENT ILLNESS:    Ms. Kyle Valera is a 80 y.o. female who is seen at [facility] today for  Cough     The patient is a 80year old female who is seen for follow up of bronchiectasis,  COPD, chronic respiratory failure, and pHTN.   She is accompanied by her daughter. She continues to wear her O2 close to 24/7. She will occasionally take the oxygen off when she is sitting. Has intermittent cough, but seems to worsen in the evenings. Takes 1/2 to 1 teaspoon of Hycodan most nights to help with cough. Feels that her OCASIO is minimally improved since cardiology recently adjusted meds. Has chronic LE edema. Denies any syncope or near syncope. No orthopnea. REVIEW OF SYSTEMS: 10 point review of systems is negative except as reported in HPI. PHYSICAL EXAM: Body mass index is 19.14 kg/m². Vitals:    11/28/22 1525   BP: (!) 150/74   Pulse: 85   Resp: 17   Temp: 96.8 °F (36 °C)   TempSrc: Skin   SpO2: 96%   Weight: 115 lb (52.2 kg)   Height: 5' 5\" (1.651 m)         General:   Alert, cooperative, no distress, appears stated age. Eyes:   Conjunctivae/corneas clear. PERRL        Mouth/Throat:  Lips, mucosa, and tongue normal. Teeth and gums normal.        Lungs:     Breath sounds minimally decreased bilaterally, but clear. Heart:   Regular rate and rhythm, S1, S2 normal.  Murmur present. No click, rub or gallop. Abdomen:    Soft, non-tender. Extremities:  Extremities normal, atraumatic, no cyanosis; 2-3+ LE edema. Skin:  Skin color normal. No rashes or lesions     Neurologic:  A&Ox3     DIAGNOSTIC TESTS:                                                                                    LABS:   Lab Results   Component Value Date/Time    WBC 5.2 03/10/2022 11:30 AM    HGB 10.3 03/10/2022 11:30 AM    HCT 33.0 03/10/2022 11:30 AM     03/10/2022 11:30 AM    TSH 5.090 04/27/2022 10:53 AM     Imaging: I performed an independent interpretation of the patient's images. CXR:   XR CHEST STANDARD TWO VW 05/24/2022    Narrative  EXAM: XR CHEST (2 VW)  INDICATION: Pneumonia, unspecified organism  COMPARISON: Chest radiograph, 1/23/2022    FINDINGS:  The cardiac silhouette is enlarged but stable.  Atherosclerotic calcifications in  the aorta. Small bilateral pleural effusions are new from prior. Mild bilateral  lower lobe dependent airspace opacities. No pneumothorax. Stable  pleural-parenchymal scarring in the lung apices. Osseous structures are grossly  intact. Impression  Small bilateral pleural effusions with dependent lower lobe atelectasis or  pneumonia. CT Chest:   CT CHEST WO CONTRAST 06/06/2019    Narrative  CT CHEST WITHOUT CONTRAST DATED 6/6/2019. History: Cough for 2 days. Lung nodule follow-up. Comparison: CT chest with contrast 2/13/2019    Technique:   Multiple contiguous helical CT images reconstructed at 5 mm were  obtained from the neck base to the mid abdomen without the administration of  contrast. All CT scans performed at this facility use one or all of the  following: Automated exposure control, adjustment of the mA and/or kVp according  to patient's size, iterative reconstruction. Findings:  CT Chest:  The base of the neck is unremarkable in appearance. No evolving axillary, or  mediastinal lymphadenopathy is seen. Evaluation of the lucio is limited due to  noncontrast technique although no obvious bulky hilar changes are seen. A  calcified right hilar lymph node is seen on image 24 suggesting chronic  granulomatous infection. Prior anterior mediastinal density now seen on image 29  is not felt to be significantly changed. The thoracic aorta is normal in  caliber. Evaluation with lung windows demonstrates interval stability of fibrotic  appearing changes in the bilateral lung apices. Once again, significant  reticulonodular appearing changes are seen in the bilateral lungs which appear  to favor the right middle lobe and lingula with additional bronchiectatic  changes particularly in the lingula. The overall appearance is felt to be most  suggestive of an inflammatory process.  Given the asymmetric involvement of the  right middle lobe and lingula, an atypical infection such as atypical  mycobacterial infection can be considered. The prior dominant nodular density in  the right lower lobe is improved with only mild residual density now seen at  this level on image 28 confirming that this represented a benign inflammatory  change. The only worsening nodular density appears to reside in the medial right  lower lobe on image 45 where a small area of clustered nodularity is seen which  does appear worsened although is also felt to be inflammatory. No pleural  effusion is seen. Lungs are expanded without evidence for pneumothorax. No  acute osseous abnormality is seen. Stable hyperkyphotic curvature of the upper  thoracic spine is seen. Limited evaluation of the upper abdomen demonstrates no acute abnormality. A  splenic calcified granuloma is seen. The adrenal glands are unremarkable in  appearance. Impression  IMPRESSION:  1. Extensive reticulonodular densities once again seen in the bilateral lungs. The prior dominant right lower lobe nodule has improved with only mild residual  density at this level confirming that this represented a benign and likely  inflammatory process. Only mild worsening of clustered nodularity is seen in the  right medial lower lobe although this is also felt to be inflammatory. There is  asymmetric involvement of a right middle lobe and lingula with some  bronchiectatic changes in the lingula. This distribution can suggest an atypical  infection such as atypical mycobacterial infection. 2. Stable anterior mediastinal density. No evolving adenopathy is suggested on  today's study. Echo:   TRANSTHORACIC ECHOCARDIOGRAM (TTE) COMPLETE (CONTRAST/BUBBLE/3D PRN) 04/29/2022    Narrative  This is a summary report. The complete report is available in the patient's medical record. If you cannot access the medical record, please contact the sending organization for a detailed fax or copy.       Left Ventricle: Hyperdynamic left ventricular systolic function with a visually estimated EF of 70 - 75%. Left ventricle size is normal. Normal wall thickness. Normal wall motion. Diastolic dysfunction present with normal LV EF. Aortic Valve: Valve structure is normal. Mild sclerosis of the aortic valve cusp. Mild to moderate regurgitation. Mitral Valve: Mildly calcified leaflet. Mild annular calcification of the mitral valve. Mild to moderate regurgitation. Tricuspid Valve: Moderately elevated RVSP. Technical qualifiers: Echo study was technically difficult due to patient's body habitus. [facility] Reference Info:                                                                                                                  Past Medical History:   Diagnosis Date    Allergic rhinitis     COPD (chronic obstructive pulmonary disease) (HCC)     Gastroesophageal reflux     Heel spur     treated with excision    History of bilateral cataract extraction     History of hemorrhoids     History of total hysterectomy     Hyperlipidemia     Hypothyroidism     Idiopathic scoliosis     Insomnia     Mammogram not needed 01/2012    wnl per pt, no longer needs d/t age        Tobacco Use      Smoking status: Never      Smokeless tobacco: Never      Tobacco comments: Quit smoking: pt's  is former smoker    Allergies   Allergen Reactions    Clarithromycin Rash     Current Outpatient Medications   Medication Instructions    albuterol (PROVENTIL) (2.5 MG/3ML) 0.083% nebulizer solution USE 1 VIAL BY NEBULIZER EVERY 4 HOURS AS NEEDED FOR WHEEZING.     albuterol sulfate  (90 Base) MCG/ACT inhaler 2 puffs, Inhalation, EVERY 4 HOURS PRN    amLODIPine (NORVASC) 10 mg, Oral, DAILY    aspirin 81 mg, Oral, DAILY    BIOTIN PO 1 tablet, Oral, DAILY    calcium carbonate 600 MG TABS tablet 1 tablet, Oral, DAILY    carvedilol (COREG) 6.25 mg, Oral, 2 TIMES DAILY WITH MEALS    cilostazol (PLETAL) 100 MG tablet TAKE 1 TABLET BY MOUTH BEFORE BREAKFAST AND DINNER    Coenzyme Q10 (CO Q-10) 100 MG CAPS Oral    dextromethorphan (DELSYM) 60 mg, Oral    ferrous sulfate (IRON 325) 325 mg, Oral, DAILY WITH BREAKFAST    fluticasone (FLONASE) 50 MCG/ACT nasal spray 1 spray, Nasal, DAILY    folic acid (FOLVITE) 1 mg, Oral, DAILY    furosemide (LASIX) 20 mg, Oral, 2 TIMES DAILY    guaiFENesin (MUCINEX) 1,200 mg, Oral, PRN    HYDROcodone homatropine (HYCODAN) 5-1.5 MG/5ML solution 5 mLs, Oral, 4 TIMES DAILY PRN    isosorbide mononitrate (IMDUR) 30 mg, Oral, DAILY    levothyroxine (SYNTHROID) 75 mcg, Oral, DAILY BEFORE BREAKFAST    Lutein 20 MG CAPS 1 tablet, Oral, DAILY    Magnesium 500 MG CAPS 1 tablet, Oral, DAILY    montelukast (SINGULAIR) 10 MG tablet TAKE 1 TABLET BY MOUTH DAILY    NONFORMULARY Mucus clearing device, flutter valve<BR>    Omega-3 1000 MG CAPS Oral    omeprazole (PRILOSEC) 20 mg, Oral, DAILY    OXYGEN Inhalation    raloxifene (EVISTA) 60 mg, Oral, DAILY    triamcinolone (KENALOG) 0.1 % cream The details of the medication are not available because there are pending changes by a home health clinician.     vitamin C 250 mg, Oral, DAILY

## 2022-11-28 ENCOUNTER — OFFICE VISIT (OUTPATIENT)
Dept: PULMONOLOGY | Age: 87
End: 2022-11-28
Payer: MEDICARE

## 2022-11-28 VITALS
OXYGEN SATURATION: 96 % | RESPIRATION RATE: 17 BRPM | HEART RATE: 85 BPM | BODY MASS INDEX: 19.16 KG/M2 | DIASTOLIC BLOOD PRESSURE: 74 MMHG | WEIGHT: 115 LBS | HEIGHT: 65 IN | TEMPERATURE: 96.8 F | SYSTOLIC BLOOD PRESSURE: 150 MMHG

## 2022-11-28 DIAGNOSIS — R05.3 CHRONIC COUGH: Primary | ICD-10-CM

## 2022-11-28 DIAGNOSIS — I27.20 PULMONARY HYPERTENSION (HCC): ICD-10-CM

## 2022-11-28 DIAGNOSIS — J96.11 CHRONIC RESPIRATORY FAILURE WITH HYPOXIA, ON HOME OXYGEN THERAPY (HCC): ICD-10-CM

## 2022-11-28 DIAGNOSIS — Z99.81 CHRONIC RESPIRATORY FAILURE WITH HYPOXIA, ON HOME OXYGEN THERAPY (HCC): ICD-10-CM

## 2022-11-28 DIAGNOSIS — J47.9 BRONCHIECTASIS WITHOUT COMPLICATION (HCC): ICD-10-CM

## 2022-11-28 PROBLEM — J44.1 COPD EXACERBATION (HCC): Status: RESOLVED | Noted: 2022-01-21 | Resolved: 2022-11-28

## 2022-11-28 PROCEDURE — G8484 FLU IMMUNIZE NO ADMIN: HCPCS | Performed by: NURSE PRACTITIONER

## 2022-11-28 PROCEDURE — 1036F TOBACCO NON-USER: CPT | Performed by: NURSE PRACTITIONER

## 2022-11-28 PROCEDURE — 1123F ACP DISCUSS/DSCN MKR DOCD: CPT | Performed by: NURSE PRACTITIONER

## 2022-11-28 PROCEDURE — G8428 CUR MEDS NOT DOCUMENT: HCPCS | Performed by: NURSE PRACTITIONER

## 2022-11-28 PROCEDURE — 99214 OFFICE O/P EST MOD 30 MIN: CPT | Performed by: NURSE PRACTITIONER

## 2022-11-28 PROCEDURE — 1090F PRES/ABSN URINE INCON ASSESS: CPT | Performed by: NURSE PRACTITIONER

## 2022-11-28 PROCEDURE — G8420 CALC BMI NORM PARAMETERS: HCPCS | Performed by: NURSE PRACTITIONER

## 2022-11-28 RX ORDER — HYDROCODONE BITARTRATE AND HOMATROPINE METHYLBROMIDE ORAL SOLUTION 5; 1.5 MG/5ML; MG/5ML
5 LIQUID ORAL 4 TIMES DAILY PRN
Qty: 240 ML | Refills: 0 | Status: SHIPPED | OUTPATIENT
Start: 2022-11-28 | End: 2022-12-28

## 2022-12-06 RX ORDER — CARVEDILOL 6.25 MG/1
6.25 TABLET ORAL 2 TIMES DAILY WITH MEALS
Qty: 180 TABLET | Refills: 0 | Status: SHIPPED | OUTPATIENT
Start: 2022-12-06

## 2022-12-06 NOTE — TELEPHONE ENCOUNTER
MEDICATION REFILL REQUEST      Name of Medication:  Carvedilol   Dose:  6.25 mg  Frequency:    Quantity:    Days' supply:  80      Pharmacy Name/Location:  did not leave

## 2022-12-06 NOTE — TELEPHONE ENCOUNTER
Requested Prescriptions     Pending Prescriptions Disp Refills    carvedilol (COREG) 6.25 MG tablet 180 tablet 3     Sig: Take 1 tablet by mouth 2 times daily (with meals)    Send to Mrs. Barrow to sign.

## 2022-12-12 RX ORDER — AMLODIPINE BESYLATE 10 MG/1
10 TABLET ORAL DAILY
Qty: 90 TABLET | Refills: 1 | Status: SHIPPED | OUTPATIENT
Start: 2022-12-12

## 2022-12-12 NOTE — TELEPHONE ENCOUNTER
Requested Prescriptions     Pending Prescriptions Disp Refills    amLODIPine (NORVASC) 10 MG tablet 90 tablet 3     Sig: Take 1 tablet by mouth daily   Send to Mrs. Barrow to sign.

## 2022-12-12 NOTE — TELEPHONE ENCOUNTER
MEDICATION REFILL REQUEST      Name of Medication:  Amlodipine  Dose:  10mg  Frequency:  QD  Quantity:  90  Days' supply:  90      Pharmacy Name/Location:  [phone]  Please call in asap pt is out of meds

## 2022-12-19 ENCOUNTER — OFFICE VISIT (OUTPATIENT)
Dept: CARDIOLOGY CLINIC | Age: 87
End: 2022-12-19
Payer: MEDICARE

## 2022-12-19 VITALS
HEART RATE: 88 BPM | BODY MASS INDEX: 18.16 KG/M2 | WEIGHT: 109 LBS | SYSTOLIC BLOOD PRESSURE: 168 MMHG | DIASTOLIC BLOOD PRESSURE: 50 MMHG | HEIGHT: 65 IN

## 2022-12-19 DIAGNOSIS — J96.11 CHRONIC RESPIRATORY FAILURE WITH HYPOXIA, ON HOME OXYGEN THERAPY (HCC): ICD-10-CM

## 2022-12-19 DIAGNOSIS — N18.32 STAGE 3B CHRONIC KIDNEY DISEASE (HCC): ICD-10-CM

## 2022-12-19 DIAGNOSIS — I50.32 CHRONIC DIASTOLIC CONGESTIVE HEART FAILURE (HCC): ICD-10-CM

## 2022-12-19 DIAGNOSIS — Z99.81 CHRONIC RESPIRATORY FAILURE WITH HYPOXIA, ON HOME OXYGEN THERAPY (HCC): ICD-10-CM

## 2022-12-19 DIAGNOSIS — I10 PRIMARY HYPERTENSION: Primary | ICD-10-CM

## 2022-12-19 DIAGNOSIS — I27.20 PULMONARY HTN (HCC): ICD-10-CM

## 2022-12-19 PROCEDURE — 1036F TOBACCO NON-USER: CPT | Performed by: INTERNAL MEDICINE

## 2022-12-19 PROCEDURE — 1123F ACP DISCUSS/DSCN MKR DOCD: CPT | Performed by: INTERNAL MEDICINE

## 2022-12-19 PROCEDURE — G8428 CUR MEDS NOT DOCUMENT: HCPCS | Performed by: INTERNAL MEDICINE

## 2022-12-19 PROCEDURE — G8484 FLU IMMUNIZE NO ADMIN: HCPCS | Performed by: INTERNAL MEDICINE

## 2022-12-19 PROCEDURE — 1090F PRES/ABSN URINE INCON ASSESS: CPT | Performed by: INTERNAL MEDICINE

## 2022-12-19 PROCEDURE — 99214 OFFICE O/P EST MOD 30 MIN: CPT | Performed by: INTERNAL MEDICINE

## 2022-12-19 PROCEDURE — G8419 CALC BMI OUT NRM PARAM NOF/U: HCPCS | Performed by: INTERNAL MEDICINE

## 2022-12-19 ASSESSMENT — ENCOUNTER SYMPTOMS
SHORTNESS OF BREATH: 1
HEMOPTYSIS: 0

## 2022-12-19 NOTE — PROGRESS NOTES
[address], 1975 [facility], [address] [place] [address]  PHONE: [phone]    Abhay Francis  11/6/1931      SUBJECTIVE:   Abhay Francis is a 80 y.o. female seen for a follow up visit regarding the following:     Chief Complaint   Patient presents with    Hypertension     3 MONTH FOLLOW UP    Coronary Artery Disease    Hyperlipidemia       HPI:    44-year-old female comes back for follow-up she has a history of severe chronic bronchiectasis lung disease chronic oxygen therapy pulmonary hypertension chronic swelling of her ankles. She been on Norvasc calcium blocker for pulm hypertension but creates more swelling. She does take Lasix twice a day but has kidney disease creatinines at times been over 2 which for this small lady is high. She has no problems with the swelling but she is not able to get her legs elevated like she should in her reclining medical chair. Past Medical History, Past Surgical History, Family history, Social History, and Medications were all reviewed with the patient today and updated as necessary.        Allergies   Allergen Reactions    Clarithromycin Rash     Past Medical History:   Diagnosis Date    Allergic rhinitis     COPD (chronic obstructive pulmonary disease) (HCC)     Gastroesophageal reflux     Heel spur     treated with excision    History of bilateral cataract extraction     History of hemorrhoids     History of total hysterectomy     Hyperlipidemia     Hypothyroidism     Idiopathic scoliosis     Insomnia     Mammogram not needed 01/2012    wnl per pt, no longer needs d/t age     Past Surgical History:   Procedure Laterality Date    CATARACT REMOVAL  1996,2000    HEMORRHOID SURGERY      ORTHOPEDIC SURGERY  1992    HEEL SPUR     CHAGO AND BSO (CERVIX REMOVED)  1979     Family History   Problem Relation Age of Onset    Tuberculosis Father     Diabetes Brother     Heart Disease Mother         leaky valve      Social History     Tobacco Use    Smoking status: Never Passive exposure: Yes    Smokeless tobacco: Never    Tobacco comments:     Quit smoking: pt's  is former smoker   Substance Use Topics    Alcohol use: No     Alcohol/week: 0.0 standard drinks       ROS:    Review of Systems   Constitutional: Negative for decreased appetite. Cardiovascular:  Positive for dyspnea on exertion and leg swelling. Negative for chest pain. Respiratory:  Positive for shortness of breath. Negative for hemoptysis. PHYSICAL EXAM:    BP (!) 168/50   Pulse 88   Ht 5' 5\" (1.651 m)   Wt 109 lb (49.4 kg)   BMI 18.14 kg/m²        Wt Readings from Last 3 Encounters:   12/19/22 109 lb (49.4 kg)   11/28/22 115 lb (52.2 kg)   06/06/22 114 lb (51.7 kg)     BP Readings from Last 3 Encounters:   12/19/22 (!) 168/50   11/28/22 (!) 150/74   09/08/22 (!) 138/54         Physical Exam  Constitutional:       General: She is not in acute distress. Cardiovascular:      Rate and Rhythm: Normal rate. Heart sounds: No murmur heard. Pulmonary:      Effort: Pulmonary effort is normal.      Breath sounds: Rhonchi present. No wheezing. Musculoskeletal:         General: Swelling present. Neurological:      Mental Status: She is alert. Medical problems and test results were reviewed with the patient today. No results found for any visits on 12/19/22. Lab Results   Component Value Date/Time     04/27/2022 10:53 AM    K 3.9 04/27/2022 10:53 AM    CL 94 04/27/2022 10:53 AM    CO2 32 04/27/2022 10:53 AM    BUN 27 04/27/2022 10:53 AM    GFRAA 42 03/10/2022 11:30 AM     Lab Results   Component Value Date/Time    CHOL 235 04/27/2022 10:53 AM    HDL 88 04/27/2022 10:53 AM    VLDL 12 04/27/2022 10:53 AM         ASSESSMENT and PLAN    Aysha Mann was seen today for hypertension, coronary artery disease and hyperlipidemia. Diagnoses and all orders for this visit:    Primary hypertension blood pressure currently stable she is taking some amlodipine and some carvedilol.     Pulmonary HTN [facility]) she has pulmonary hypertension (diastolic dysfunction also she is got underlying significant lung disease on chronic oxygen hypoxia which also contributes. Chronic diastolic congestive heart failure ([address] she had some known diastolic function LV systolic function is normal    Chronic respiratory failure with hypoxia, on home oxygen therapy ([facility] she is unchanged she still on oxygen    Stage 3b chronic kidney disease ([facility] fortunately she is got a kidney disease which makes it difficult to titrate further with the diuretic she is already on Lasix. I have had a little reluctant to give her spironolactone at this time. She needs to try to elevate her feet more she can she does have a recliner and she just does not use it      [unfilled]      No follow-up provider specified.     Loni Roca MD  12/19/2022  5:15 PM

## 2023-01-01 ENCOUNTER — APPOINTMENT (OUTPATIENT)
Dept: NON INVASIVE DIAGNOSTICS | Age: 88
DRG: 871 | End: 2023-01-01
Payer: MEDICARE

## 2023-01-01 ENCOUNTER — APPOINTMENT (OUTPATIENT)
Dept: GENERAL RADIOLOGY | Age: 88
DRG: 871 | End: 2023-01-01
Payer: MEDICARE

## 2023-01-01 ENCOUNTER — TELEPHONE (OUTPATIENT)
Dept: FAMILY MEDICINE CLINIC | Facility: CLINIC | Age: 88
End: 2023-01-01

## 2023-01-01 ENCOUNTER — APPOINTMENT (OUTPATIENT)
Dept: CT IMAGING | Age: 88
DRG: 871 | End: 2023-01-01
Payer: MEDICARE

## 2023-01-01 ENCOUNTER — HOSPITAL ENCOUNTER (INPATIENT)
Age: 88
LOS: 2 days | DRG: 871 | End: 2023-03-22
Attending: EMERGENCY MEDICINE | Admitting: HOSPITALIST
Payer: MEDICARE

## 2023-01-01 VITALS
SYSTOLIC BLOOD PRESSURE: 128 MMHG | DIASTOLIC BLOOD PRESSURE: 44 MMHG | HEIGHT: 65 IN | WEIGHT: 110.89 LBS | OXYGEN SATURATION: 93 % | TEMPERATURE: 97 F | BODY MASS INDEX: 18.48 KG/M2

## 2023-01-01 DIAGNOSIS — I50.9 ACUTE ON CHRONIC CONGESTIVE HEART FAILURE, UNSPECIFIED HEART FAILURE TYPE (HCC): ICD-10-CM

## 2023-01-01 DIAGNOSIS — J96.22 ACUTE ON CHRONIC RESPIRATORY FAILURE WITH HYPOXIA AND HYPERCAPNIA (HCC): Primary | ICD-10-CM

## 2023-01-01 DIAGNOSIS — J96.21 ACUTE ON CHRONIC RESPIRATORY FAILURE WITH HYPOXIA AND HYPERCAPNIA (HCC): Primary | ICD-10-CM

## 2023-01-01 DIAGNOSIS — E87.70 HYPERVOLEMIA, UNSPECIFIED HYPERVOLEMIA TYPE: ICD-10-CM

## 2023-01-01 DIAGNOSIS — J18.9 MULTIFOCAL PNEUMONIA: ICD-10-CM

## 2023-01-01 LAB
ALBUMIN SERPL-MCNC: 2.5 G/DL (ref 3.2–4.6)
ALBUMIN SERPL-MCNC: 2.5 G/DL (ref 3.2–4.6)
ALBUMIN SERPL-MCNC: 2.8 G/DL (ref 3.2–4.6)
ALBUMIN/GLOB SERPL: 0.4 (ref 0.4–1.6)
ALBUMIN/GLOB SERPL: 0.5 (ref 0.4–1.6)
ALBUMIN/GLOB SERPL: 0.5 (ref 0.4–1.6)
ALP SERPL-CCNC: 59 U/L (ref 50–130)
ALP SERPL-CCNC: 74 U/L (ref 50–136)
ALP SERPL-CCNC: 75 U/L (ref 50–136)
ALT SERPL-CCNC: 18 U/L (ref 12–65)
ALT SERPL-CCNC: 21 U/L (ref 12–65)
ALT SERPL-CCNC: 47 U/L (ref 12–65)
ANION GAP SERPL CALC-SCNC: 4 MMOL/L (ref 2–11)
ANION GAP SERPL CALC-SCNC: 4 MMOL/L (ref 2–11)
ANION GAP SERPL CALC-SCNC: 5 MMOL/L (ref 2–11)
APPEARANCE UR: CLEAR
ARTERIAL PATENCY WRIST A: ABNORMAL
AST SERPL-CCNC: 29 U/L (ref 15–37)
AST SERPL-CCNC: 30 U/L (ref 15–37)
AST SERPL-CCNC: 63 U/L (ref 15–37)
BACTERIA URNS QL MICRO: 0 /HPF
BASE EXCESS BLD CALC-SCNC: 8.5 MMOL/L
BASOPHILS # BLD: 0 K/UL (ref 0–0.2)
BASOPHILS # BLD: 0.1 K/UL (ref 0–0.2)
BASOPHILS NFR BLD: 0 % (ref 0–2)
BASOPHILS NFR BLD: 1 % (ref 0–2)
BDY SITE: ABNORMAL
BILIRUB SERPL-MCNC: 0.4 MG/DL (ref 0.2–1.1)
BILIRUB SERPL-MCNC: 0.4 MG/DL (ref 0.2–1.1)
BILIRUB SERPL-MCNC: 0.5 MG/DL (ref 0.2–1.1)
BILIRUB UR QL: NEGATIVE
BUN SERPL-MCNC: 48 MG/DL (ref 8–23)
BUN SERPL-MCNC: 48 MG/DL (ref 8–23)
BUN SERPL-MCNC: 54 MG/DL (ref 8–23)
CALCIUM SERPL-MCNC: 8.8 MG/DL (ref 8.3–10.4)
CALCIUM SERPL-MCNC: 8.8 MG/DL (ref 8.3–10.4)
CALCIUM SERPL-MCNC: 9 MG/DL (ref 8.3–10.4)
CHLORIDE SERPL-SCNC: 93 MMOL/L (ref 101–110)
CHLORIDE SERPL-SCNC: 96 MMOL/L (ref 101–110)
CHLORIDE SERPL-SCNC: 97 MMOL/L (ref 101–110)
CO2 BLD-SCNC: 34 MMOL/L (ref 13–23)
CO2 SERPL-SCNC: 32 MMOL/L (ref 21–32)
CO2 SERPL-SCNC: 33 MMOL/L (ref 21–32)
CO2 SERPL-SCNC: 34 MMOL/L (ref 21–32)
COLOR UR: ABNORMAL
CREAT SERPL-MCNC: 2.1 MG/DL (ref 0.6–1)
CREAT SERPL-MCNC: 2.17 MG/DL (ref 0.6–1)
CREAT SERPL-MCNC: 2.24 MG/DL (ref 0.6–1)
DIFFERENTIAL METHOD BLD: ABNORMAL
ECHO AO ASC DIAM: 2.9 CM
ECHO AO ASCENDING AORTA INDEX: 1.88 CM/M2
ECHO AO ROOT DIAM: 2.6 CM
ECHO AO ROOT INDEX: 1.69 CM/M2
ECHO AR MAX VEL PISA: 4.2 M/S
ECHO AV AREA PEAK VELOCITY: 2.4 CM2
ECHO AV AREA VTI: 2.4 CM2
ECHO AV AREA/BSA PEAK VELOCITY: 1.6 CM2/M2
ECHO AV AREA/BSA VTI: 1.6 CM2/M2
ECHO AV MEAN GRADIENT: 8 MMHG
ECHO AV MEAN VELOCITY: 1.3 M/S
ECHO AV PEAK GRADIENT: 14 MMHG
ECHO AV PEAK VELOCITY: 1.9 M/S
ECHO AV REGURGITANT PHT: 194 MS
ECHO AV VELOCITY RATIO: 0.84
ECHO AV VTI: 41.1 CM
ECHO BSA: 1.53 M2
ECHO EST RA PRESSURE: 3 MMHG
ECHO IVC EXP: 1.3 CM
ECHO LA AREA 2C: 16.7 CM2
ECHO LA AREA 4C: 14.5 CM2
ECHO LA DIAMETER INDEX: 2.01 CM/M2
ECHO LA DIAMETER: 3.1 CM
ECHO LA MAJOR AXIS: 4.3 CM
ECHO LA MINOR AXIS: 5.3 CM
ECHO LA TO AORTIC ROOT RATIO: 1.19
ECHO LA VOL 2C: 44 ML (ref 22–52)
ECHO LA VOL 4C: 40 ML (ref 22–52)
ECHO LA VOL BP: 46 ML (ref 22–52)
ECHO LA VOL/BSA BIPLANE: 30 ML/M2 (ref 16–34)
ECHO LA VOLUME INDEX A2C: 29 ML/M2 (ref 16–34)
ECHO LA VOLUME INDEX A4C: 26 ML/M2 (ref 16–34)
ECHO LV E' LATERAL VELOCITY: 5 CM/S
ECHO LV E' SEPTAL VELOCITY: 7 CM/S
ECHO LV EDV A2C: 56 ML
ECHO LV EDV A4C: 41 ML
ECHO LV EDV INDEX A4C: 27 ML/M2
ECHO LV EDV NDEX A2C: 36 ML/M2
ECHO LV EJECTION FRACTION A2C: 62 %
ECHO LV EJECTION FRACTION A4C: 66 %
ECHO LV EJECTION FRACTION BIPLANE: 64 % (ref 55–100)
ECHO LV ESV A2C: 21 ML
ECHO LV ESV A4C: 14 ML
ECHO LV ESV INDEX A2C: 14 ML/M2
ECHO LV ESV INDEX A4C: 9 ML/M2
ECHO LV FRACTIONAL SHORTENING: 20 % (ref 28–44)
ECHO LV INTERNAL DIMENSION DIASTOLE INDEX: 2.27 CM/M2
ECHO LV INTERNAL DIMENSION DIASTOLIC: 3.5 CM (ref 3.9–5.3)
ECHO LV INTERNAL DIMENSION SYSTOLIC INDEX: 1.82 CM/M2
ECHO LV INTERNAL DIMENSION SYSTOLIC: 2.8 CM
ECHO LV IVSD: 1.3 CM (ref 0.6–0.9)
ECHO LV MASS 2D: 111 G (ref 67–162)
ECHO LV MASS INDEX 2D: 72.1 G/M2 (ref 43–95)
ECHO LV POSTERIOR WALL DIASTOLIC: 0.8 CM (ref 0.6–0.9)
ECHO LV RELATIVE WALL THICKNESS RATIO: 0.46
ECHO LVOT AREA: 2.8 CM2
ECHO LVOT AV VTI INDEX: 0.83
ECHO LVOT DIAM: 1.9 CM
ECHO LVOT MEAN GRADIENT: 6 MMHG
ECHO LVOT PEAK GRADIENT: 10 MMHG
ECHO LVOT PEAK VELOCITY: 1.6 M/S
ECHO LVOT STROKE VOLUME INDEX: 62.9 ML/M2
ECHO LVOT SV: 96.9 ML
ECHO LVOT VTI: 34.2 CM
ECHO MV A VELOCITY: 1.83 M/S
ECHO MV AREA VTI: 2.4 CM2
ECHO MV E DECELERATION TIME (DT): 217 MS
ECHO MV E VELOCITY: 1.77 M/S
ECHO MV E/A RATIO: 0.97
ECHO MV E/E' LATERAL: 35.4
ECHO MV E/E' RATIO (AVERAGED): 30.34
ECHO MV E/E' SEPTAL: 25.29
ECHO MV LVOT VTI INDEX: 1.18
ECHO MV MAX VELOCITY: 1.8 M/S
ECHO MV MEAN GRADIENT: 7 MMHG
ECHO MV MEAN VELOCITY: 1.2 M/S
ECHO MV MEAN VELOCITY: 1.2 M/S
ECHO MV PEAK GRADIENT: 13 MMHG
ECHO MV REGURGITANT PEAK GRADIENT: 117 MMHG
ECHO MV REGURGITANT PEAK VELOCITY: 5.4 M/S
ECHO MV REGURGITANT VTIA: 168 CM
ECHO MV VTI: 40.4 CM
ECHO PV ACCELERATION TIME (AT): 121 MS
ECHO PV MAX VELOCITY: 1.1 M/S
ECHO PV PEAK GRADIENT: 4 MMHG
ECHO RIGHT VENTRICULAR SYSTOLIC PRESSURE (RVSP): 53 MMHG
ECHO RV BASAL DIMENSION: 3.3 CM
ECHO RV FREE WALL PEAK S': 11 CM/S
ECHO RV TAPSE: 2.2 CM (ref 1.7–?)
ECHO TV REGURGITANT MAX VELOCITY: 3.54 M/S
ECHO TV REGURGITANT PEAK GRADIENT: 50 MMHG
EKG ATRIAL RATE: 94 BPM
EKG DIAGNOSIS: NORMAL
EKG P AXIS: 71 DEGREES
EKG P-R INTERVAL: 198 MS
EKG Q-T INTERVAL: 323 MS
EKG QRS DURATION: 73 MS
EKG QTC CALCULATION (BAZETT): 404 MS
EKG R AXIS: 13 DEGREES
EKG T AXIS: 139 DEGREES
EKG VENTRICULAR RATE: 94 BPM
EOSINOPHIL # BLD: 0 K/UL (ref 0–0.8)
EOSINOPHIL # BLD: 0.3 K/UL (ref 0–0.8)
EOSINOPHIL NFR BLD: 0 % (ref 0.5–7.8)
EOSINOPHIL NFR BLD: 2 % (ref 0.5–7.8)
EPI CELLS #/AREA URNS HPF: ABNORMAL /HPF
ERYTHROCYTE [DISTWIDTH] IN BLOOD BY AUTOMATED COUNT: 14.3 % (ref 11.9–14.6)
ERYTHROCYTE [DISTWIDTH] IN BLOOD BY AUTOMATED COUNT: 14.4 % (ref 11.9–14.6)
ERYTHROCYTE [DISTWIDTH] IN BLOOD BY AUTOMATED COUNT: 14.6 % (ref 11.9–14.6)
ERYTHROCYTE [DISTWIDTH] IN BLOOD BY AUTOMATED COUNT: 14.7 % (ref 11.9–14.6)
ERYTHROCYTE [DISTWIDTH] IN BLOOD BY AUTOMATED COUNT: 14.7 % (ref 11.9–14.6)
FLUAV RNA SPEC QL NAA+PROBE: NOT DETECTED
FLUBV RNA SPEC QL NAA+PROBE: NOT DETECTED
GAS FLOW.O2 O2 DELIVERY SYS: ABNORMAL
GLOBULIN SER CALC-MCNC: 4.8 G/DL (ref 2.8–4.5)
GLOBULIN SER CALC-MCNC: 5.6 G/DL (ref 2.8–4.5)
GLOBULIN SER CALC-MCNC: 5.8 G/DL (ref 2.8–4.5)
GLUCOSE SERPL-MCNC: 146 MG/DL (ref 65–100)
GLUCOSE SERPL-MCNC: 157 MG/DL (ref 65–100)
GLUCOSE SERPL-MCNC: 158 MG/DL (ref 65–100)
GLUCOSE UR STRIP.AUTO-MCNC: NEGATIVE MG/DL
HCO3 BLD-SCNC: 34.9 MMOL/L (ref 22–26)
HCT VFR BLD AUTO: 23.1 % (ref 35.8–46.3)
HCT VFR BLD AUTO: 24.8 % (ref 35.8–46.3)
HCT VFR BLD AUTO: 25.5 % (ref 35.8–46.3)
HCT VFR BLD AUTO: 27.5 % (ref 35.8–46.3)
HCT VFR BLD AUTO: 29.6 % (ref 35.8–46.3)
HGB BLD-MCNC: 7.3 G/DL (ref 11.7–15.4)
HGB BLD-MCNC: 7.7 G/DL (ref 11.7–15.4)
HGB BLD-MCNC: 8 G/DL (ref 11.7–15.4)
HGB BLD-MCNC: 8.6 G/DL (ref 11.7–15.4)
HGB BLD-MCNC: 9.2 G/DL (ref 11.7–15.4)
HGB UR QL STRIP: ABNORMAL
IMM GRANULOCYTES # BLD AUTO: 0 K/UL (ref 0–0.5)
IMM GRANULOCYTES # BLD AUTO: 0.1 K/UL (ref 0–0.5)
IMM GRANULOCYTES # BLD AUTO: 0.2 K/UL (ref 0–0.5)
IMM GRANULOCYTES NFR BLD AUTO: 0 % (ref 0–5)
IMM GRANULOCYTES NFR BLD AUTO: 1 % (ref 0–5)
KETONES UR QL STRIP.AUTO: NEGATIVE MG/DL
LACTATE SERPL-SCNC: 0.6 MMOL/L (ref 0.4–2)
LACTATE SERPL-SCNC: 1.5 MMOL/L (ref 0.4–2)
LEUKOCYTE ESTERASE UR QL STRIP.AUTO: ABNORMAL
LYMPHOCYTES # BLD: 0.1 K/UL (ref 0.5–4.6)
LYMPHOCYTES # BLD: 0.2 K/UL (ref 0.5–4.6)
LYMPHOCYTES # BLD: 0.2 K/UL (ref 0.5–4.6)
LYMPHOCYTES # BLD: 0.3 K/UL (ref 0.5–4.6)
LYMPHOCYTES # BLD: 0.6 K/UL (ref 0.5–4.6)
LYMPHOCYTES NFR BLD: 1 % (ref 13–44)
LYMPHOCYTES NFR BLD: 2 % (ref 13–44)
LYMPHOCYTES NFR BLD: 4 % (ref 13–44)
MCH RBC QN AUTO: 27.7 PG (ref 26.1–32.9)
MCH RBC QN AUTO: 27.7 PG (ref 26.1–32.9)
MCH RBC QN AUTO: 28 PG (ref 26.1–32.9)
MCH RBC QN AUTO: 28.1 PG (ref 26.1–32.9)
MCH RBC QN AUTO: 28.2 PG (ref 26.1–32.9)
MCHC RBC AUTO-ENTMCNC: 31 G/DL (ref 31.4–35)
MCHC RBC AUTO-ENTMCNC: 31.1 G/DL (ref 31.4–35)
MCHC RBC AUTO-ENTMCNC: 31.3 G/DL (ref 31.4–35)
MCHC RBC AUTO-ENTMCNC: 31.4 G/DL (ref 31.4–35)
MCHC RBC AUTO-ENTMCNC: 31.6 G/DL (ref 31.4–35)
MCV RBC AUTO: 88.4 FL (ref 82–102)
MCV RBC AUTO: 89.2 FL (ref 82–102)
MCV RBC AUTO: 89.2 FL (ref 82–102)
MCV RBC AUTO: 89.5 FL (ref 82–102)
MCV RBC AUTO: 90 FL (ref 82–102)
MONOCYTES # BLD: 0.1 K/UL (ref 0.1–1.3)
MONOCYTES # BLD: 0.2 K/UL (ref 0.1–1.3)
MONOCYTES # BLD: 0.4 K/UL (ref 0.1–1.3)
MONOCYTES # BLD: 0.6 K/UL (ref 0.1–1.3)
MONOCYTES # BLD: 0.8 K/UL (ref 0.1–1.3)
MONOCYTES NFR BLD: 1 % (ref 4–12)
MONOCYTES NFR BLD: 2 % (ref 4–12)
MONOCYTES NFR BLD: 2 % (ref 4–12)
MONOCYTES NFR BLD: 3 % (ref 4–12)
MONOCYTES NFR BLD: 6 % (ref 4–12)
NEUTS SEG # BLD: 10.8 K/UL (ref 1.7–8.2)
NEUTS SEG # BLD: 11.8 K/UL (ref 1.7–8.2)
NEUTS SEG # BLD: 18.1 K/UL (ref 1.7–8.2)
NEUTS SEG # BLD: 26.5 K/UL (ref 1.7–8.2)
NEUTS SEG # BLD: 7.9 K/UL (ref 1.7–8.2)
NEUTS SEG NFR BLD: 87 % (ref 43–78)
NEUTS SEG NFR BLD: 95 % (ref 43–78)
NEUTS SEG NFR BLD: 96 % (ref 43–78)
NEUTS SEG NFR BLD: 96 % (ref 43–78)
NEUTS SEG NFR BLD: 97 % (ref 43–78)
NITRITE UR QL STRIP.AUTO: NEGATIVE
NRBC # BLD: 0 K/UL (ref 0–0.2)
NT PRO BNP: 1844 PG/ML
PCO2 BLD: 57.9 MMHG (ref 35–45)
PH BLD: 7.39 (ref 7.35–7.45)
PH UR STRIP: 6 (ref 5–9)
PLATELET # BLD AUTO: 175 K/UL (ref 150–450)
PLATELET # BLD AUTO: 205 K/UL (ref 150–450)
PLATELET # BLD AUTO: 225 K/UL (ref 150–450)
PLATELET # BLD AUTO: 268 K/UL (ref 150–450)
PLATELET # BLD AUTO: 279 K/UL (ref 150–450)
PMV BLD AUTO: 8.1 FL (ref 9.4–12.3)
PMV BLD AUTO: 8.2 FL (ref 9.4–12.3)
PMV BLD AUTO: 8.3 FL (ref 9.4–12.3)
PMV BLD AUTO: 8.5 FL (ref 9.4–12.3)
PMV BLD AUTO: 8.5 FL (ref 9.4–12.3)
PO2 BLD: 46 MMHG (ref 75–100)
POC FIO2: 6
POTASSIUM SERPL-SCNC: 3.9 MMOL/L (ref 3.5–5.1)
POTASSIUM SERPL-SCNC: 4 MMOL/L (ref 3.5–5.1)
POTASSIUM SERPL-SCNC: 4.1 MMOL/L (ref 3.5–5.1)
PROCALCITONIN SERPL-MCNC: 0.05 NG/ML (ref 0–0.49)
PROT SERPL-MCNC: 7.3 G/DL (ref 6.3–8.2)
PROT SERPL-MCNC: 8.1 G/DL (ref 6.3–8.2)
PROT SERPL-MCNC: 8.6 G/DL (ref 6.3–8.2)
PROT UR STRIP-MCNC: 100 MG/DL
RBC # BLD AUTO: 2.59 M/UL (ref 4.05–5.2)
RBC # BLD AUTO: 2.78 M/UL (ref 4.05–5.2)
RBC # BLD AUTO: 2.85 M/UL (ref 4.05–5.2)
RBC # BLD AUTO: 3.11 M/UL (ref 4.05–5.2)
RBC # BLD AUTO: 3.29 M/UL (ref 4.05–5.2)
RBC #/AREA URNS HPF: ABNORMAL /HPF
SAO2 % BLD: 79.4 % (ref 95–98)
SARS-COV-2 RDRP RESP QL NAA+PROBE: NOT DETECTED
SERVICE CMNT-IMP: ABNORMAL
SODIUM SERPL-SCNC: 132 MMOL/L (ref 133–143)
SODIUM SERPL-SCNC: 132 MMOL/L (ref 133–143)
SODIUM SERPL-SCNC: 134 MMOL/L (ref 133–143)
SOURCE: NORMAL
SP GR UR REFRACTOMETRY: 1.01 (ref 1–1.02)
SPECIMEN TYPE: ABNORMAL
UROBILINOGEN UR QL STRIP.AUTO: 0.2 EU/DL (ref 0.2–1)
WBC # BLD AUTO: 11.3 K/UL (ref 4.3–11.1)
WBC # BLD AUTO: 13.7 K/UL (ref 4.3–11.1)
WBC # BLD AUTO: 19 K/UL (ref 4.3–11.1)
WBC # BLD AUTO: 27.5 K/UL (ref 4.3–11.1)
WBC # BLD AUTO: 8.1 K/UL (ref 4.3–11.1)
WBC URNS QL MICRO: ABNORMAL /HPF

## 2023-01-01 PROCEDURE — 84145 PROCALCITONIN (PCT): CPT

## 2023-01-01 PROCEDURE — 85025 COMPLETE CBC W/AUTO DIFF WBC: CPT

## 2023-01-01 PROCEDURE — 94760 N-INVAS EAR/PLS OXIMETRY 1: CPT

## 2023-01-01 PROCEDURE — 6370000000 HC RX 637 (ALT 250 FOR IP): Performed by: HOSPITALIST

## 2023-01-01 PROCEDURE — 36415 COLL VENOUS BLD VENIPUNCTURE: CPT

## 2023-01-01 PROCEDURE — 93306 TTE W/DOPPLER COMPLETE: CPT

## 2023-01-01 PROCEDURE — 94660 CPAP INITIATION&MGMT: CPT

## 2023-01-01 PROCEDURE — 81001 URINALYSIS AUTO W/SCOPE: CPT

## 2023-01-01 PROCEDURE — 87040 BLOOD CULTURE FOR BACTERIA: CPT

## 2023-01-01 PROCEDURE — 5A09357 ASSISTANCE WITH RESPIRATORY VENTILATION, LESS THAN 24 CONSECUTIVE HOURS, CONTINUOUS POSITIVE AIRWAY PRESSURE: ICD-10-PCS | Performed by: HOSPITALIST

## 2023-01-01 PROCEDURE — 94640 AIRWAY INHALATION TREATMENT: CPT

## 2023-01-01 PROCEDURE — 36600 WITHDRAWAL OF ARTERIAL BLOOD: CPT

## 2023-01-01 PROCEDURE — 6360000002 HC RX W HCPCS: Performed by: HOSPITALIST

## 2023-01-01 PROCEDURE — 94761 N-INVAS EAR/PLS OXIMETRY MLT: CPT

## 2023-01-01 PROCEDURE — 87635 SARS-COV-2 COVID-19 AMP PRB: CPT

## 2023-01-01 PROCEDURE — 71045 X-RAY EXAM CHEST 1 VIEW: CPT

## 2023-01-01 PROCEDURE — 87502 INFLUENZA DNA AMP PROBE: CPT

## 2023-01-01 PROCEDURE — 2000000000 HC ICU R&B

## 2023-01-01 PROCEDURE — 6370000000 HC RX 637 (ALT 250 FOR IP)

## 2023-01-01 PROCEDURE — 2580000003 HC RX 258: Performed by: HOSPITALIST

## 2023-01-01 PROCEDURE — 2700000000 HC OXYGEN THERAPY PER DAY

## 2023-01-01 PROCEDURE — 96365 THER/PROPH/DIAG IV INF INIT: CPT

## 2023-01-01 PROCEDURE — 83880 ASSAY OF NATRIURETIC PEPTIDE: CPT

## 2023-01-01 PROCEDURE — 6360000002 HC RX W HCPCS: Performed by: EMERGENCY MEDICINE

## 2023-01-01 PROCEDURE — 94664 DEMO&/EVAL PT USE INHALER: CPT

## 2023-01-01 PROCEDURE — 93306 TTE W/DOPPLER COMPLETE: CPT | Performed by: INTERNAL MEDICINE

## 2023-01-01 PROCEDURE — 93005 ELECTROCARDIOGRAM TRACING: CPT | Performed by: EMERGENCY MEDICINE

## 2023-01-01 PROCEDURE — 80053 COMPREHEN METABOLIC PANEL: CPT

## 2023-01-01 PROCEDURE — 82803 BLOOD GASES ANY COMBINATION: CPT

## 2023-01-01 PROCEDURE — 2580000003 HC RX 258: Performed by: EMERGENCY MEDICINE

## 2023-01-01 PROCEDURE — 6370000000 HC RX 637 (ALT 250 FOR IP): Performed by: EMERGENCY MEDICINE

## 2023-01-01 PROCEDURE — 6370000000 HC RX 637 (ALT 250 FOR IP): Performed by: INTERNAL MEDICINE

## 2023-01-01 PROCEDURE — 83605 ASSAY OF LACTIC ACID: CPT

## 2023-01-01 PROCEDURE — 96375 TX/PRO/DX INJ NEW DRUG ADDON: CPT

## 2023-01-01 PROCEDURE — 99285 EMERGENCY DEPT VISIT HI MDM: CPT

## 2023-01-01 RX ORDER — MORPHINE SULFATE 10 MG/5ML
5 SOLUTION ORAL
Status: DISCONTINUED | OUTPATIENT
Start: 2023-01-01 | End: 2023-01-01 | Stop reason: HOSPADM

## 2023-01-01 RX ORDER — DOXYCYCLINE HYCLATE 100 MG/1
100 CAPSULE ORAL EVERY 12 HOURS SCHEDULED
Status: DISCONTINUED | OUTPATIENT
Start: 2023-01-01 | End: 2023-01-01

## 2023-01-01 RX ORDER — PANTOPRAZOLE SODIUM 40 MG/1
40 TABLET, DELAYED RELEASE ORAL
Status: DISCONTINUED | OUTPATIENT
Start: 2023-01-01 | End: 2023-01-01

## 2023-01-01 RX ORDER — POLYETHYLENE GLYCOL 3350 17 G/17G
17 POWDER, FOR SOLUTION ORAL DAILY PRN
Status: DISCONTINUED | OUTPATIENT
Start: 2023-01-01 | End: 2023-01-01

## 2023-01-01 RX ORDER — IPRATROPIUM BROMIDE AND ALBUTEROL SULFATE 2.5; .5 MG/3ML; MG/3ML
1 SOLUTION RESPIRATORY (INHALATION)
Status: COMPLETED | OUTPATIENT
Start: 2023-01-01 | End: 2023-01-01

## 2023-01-01 RX ORDER — GUAIFENESIN 600 MG/1
600 TABLET, EXTENDED RELEASE ORAL 2 TIMES DAILY
Status: DISCONTINUED | OUTPATIENT
Start: 2023-01-01 | End: 2023-01-01

## 2023-01-01 RX ORDER — CILOSTAZOL 50 MG/1
100 TABLET ORAL
Status: DISCONTINUED | OUTPATIENT
Start: 2023-01-01 | End: 2023-01-01

## 2023-01-01 RX ORDER — DIPHENOXYLATE HYDROCHLORIDE AND ATROPINE SULFATE 2.5; .025 MG/1; MG/1
1 TABLET ORAL 4 TIMES DAILY PRN
Status: DISCONTINUED | OUTPATIENT
Start: 2023-01-01 | End: 2023-01-01 | Stop reason: HOSPADM

## 2023-01-01 RX ORDER — ACETAMINOPHEN 325 MG/1
650 TABLET ORAL EVERY 6 HOURS PRN
Status: DISCONTINUED | OUTPATIENT
Start: 2023-01-01 | End: 2023-01-01

## 2023-01-01 RX ORDER — ACETAMINOPHEN 325 MG/1
650 TABLET ORAL EVERY 4 HOURS PRN
Status: DISCONTINUED | OUTPATIENT
Start: 2023-01-01 | End: 2023-01-01 | Stop reason: HOSPADM

## 2023-01-01 RX ORDER — ACETAMINOPHEN 650 MG/1
650 SUPPOSITORY RECTAL EVERY 6 HOURS PRN
Status: DISCONTINUED | OUTPATIENT
Start: 2023-01-01 | End: 2023-01-01

## 2023-01-01 RX ORDER — SCOLOPAMINE TRANSDERMAL SYSTEM 1 MG/1
1 PATCH, EXTENDED RELEASE TRANSDERMAL
Status: DISCONTINUED | OUTPATIENT
Start: 2023-01-01 | End: 2023-01-01 | Stop reason: HOSPADM

## 2023-01-01 RX ORDER — IPRATROPIUM BROMIDE AND ALBUTEROL SULFATE 2.5; .5 MG/3ML; MG/3ML
SOLUTION RESPIRATORY (INHALATION)
Status: COMPLETED
Start: 2023-01-01 | End: 2023-01-01

## 2023-01-01 RX ORDER — ONDANSETRON 2 MG/ML
4 INJECTION INTRAMUSCULAR; INTRAVENOUS EVERY 6 HOURS PRN
Status: DISCONTINUED | OUTPATIENT
Start: 2023-01-01 | End: 2023-01-01 | Stop reason: HOSPADM

## 2023-01-01 RX ORDER — ISOSORBIDE MONONITRATE 30 MG/1
30 TABLET, EXTENDED RELEASE ORAL DAILY
Status: DISCONTINUED | OUTPATIENT
Start: 2023-01-01 | End: 2023-01-01

## 2023-01-01 RX ORDER — ASPIRIN 81 MG/1
81 TABLET ORAL DAILY
Status: DISCONTINUED | OUTPATIENT
Start: 2023-01-01 | End: 2023-01-01

## 2023-01-01 RX ORDER — LEVOTHYROXINE SODIUM 0.07 MG/1
75 TABLET ORAL
Status: DISCONTINUED | OUTPATIENT
Start: 2023-01-01 | End: 2023-01-01 | Stop reason: HOSPADM

## 2023-01-01 RX ORDER — SODIUM CHLORIDE 0.9 % (FLUSH) 0.9 %
5-40 SYRINGE (ML) INJECTION PRN
Status: DISCONTINUED | OUTPATIENT
Start: 2023-01-01 | End: 2023-01-01 | Stop reason: HOSPADM

## 2023-01-01 RX ORDER — LORAZEPAM 2 MG/ML
1 INJECTION INTRAMUSCULAR
Status: DISCONTINUED | OUTPATIENT
Start: 2023-01-01 | End: 2023-01-01 | Stop reason: HOSPADM

## 2023-01-01 RX ORDER — OXYCODONE HYDROCHLORIDE 5 MG/1
2.5 TABLET ORAL EVERY 6 HOURS PRN
Status: DISCONTINUED | OUTPATIENT
Start: 2023-01-01 | End: 2023-01-01 | Stop reason: HOSPADM

## 2023-01-01 RX ORDER — METHYLPREDNISOLONE SODIUM SUCCINATE 40 MG/ML
40 INJECTION, POWDER, LYOPHILIZED, FOR SOLUTION INTRAMUSCULAR; INTRAVENOUS EVERY 8 HOURS
Status: DISCONTINUED | OUTPATIENT
Start: 2023-01-01 | End: 2023-01-01

## 2023-01-01 RX ORDER — MORPHINE SULFATE 4 MG/ML
4 INJECTION, SOLUTION INTRAMUSCULAR; INTRAVENOUS
Status: DISCONTINUED | OUTPATIENT
Start: 2023-01-01 | End: 2023-01-01 | Stop reason: HOSPADM

## 2023-01-01 RX ORDER — AMLODIPINE BESYLATE 10 MG/1
10 TABLET ORAL DAILY
Status: DISCONTINUED | OUTPATIENT
Start: 2023-01-01 | End: 2023-01-01

## 2023-01-01 RX ORDER — LORAZEPAM 2 MG/ML
1 INJECTION INTRAMUSCULAR EVERY 6 HOURS PRN
Status: DISCONTINUED | OUTPATIENT
Start: 2023-01-01 | End: 2023-01-01

## 2023-01-01 RX ORDER — ONDANSETRON 4 MG/1
4 TABLET, ORALLY DISINTEGRATING ORAL EVERY 8 HOURS PRN
Status: DISCONTINUED | OUTPATIENT
Start: 2023-01-01 | End: 2023-01-01

## 2023-01-01 RX ORDER — SODIUM CHLORIDE 9 MG/ML
INJECTION, SOLUTION INTRAVENOUS PRN
Status: DISCONTINUED | OUTPATIENT
Start: 2023-01-01 | End: 2023-01-01 | Stop reason: HOSPADM

## 2023-01-01 RX ORDER — LEVALBUTEROL INHALATION SOLUTION 0.63 MG/3ML
0.63 SOLUTION RESPIRATORY (INHALATION)
Status: DISCONTINUED | OUTPATIENT
Start: 2023-01-01 | End: 2023-01-01

## 2023-01-01 RX ORDER — MORPHINE SULFATE 2 MG/ML
2 INJECTION, SOLUTION INTRAMUSCULAR; INTRAVENOUS
Status: DISCONTINUED | OUTPATIENT
Start: 2023-01-01 | End: 2023-01-01

## 2023-01-01 RX ORDER — CARVEDILOL 6.25 MG/1
6.25 TABLET ORAL 2 TIMES DAILY WITH MEALS
Status: DISCONTINUED | OUTPATIENT
Start: 2023-01-01 | End: 2023-01-01

## 2023-01-01 RX ORDER — FOLIC ACID 1 MG/1
1 TABLET ORAL DAILY
Status: DISCONTINUED | OUTPATIENT
Start: 2023-01-01 | End: 2023-01-01

## 2023-01-01 RX ORDER — SODIUM CHLORIDE 0.9 % (FLUSH) 0.9 %
5-40 SYRINGE (ML) INJECTION EVERY 12 HOURS SCHEDULED
Status: DISCONTINUED | OUTPATIENT
Start: 2023-01-01 | End: 2023-01-01 | Stop reason: HOSPADM

## 2023-01-01 RX ORDER — IPRATROPIUM BROMIDE AND ALBUTEROL SULFATE 2.5; .5 MG/3ML; MG/3ML
1 SOLUTION RESPIRATORY (INHALATION)
Status: DISCONTINUED | OUTPATIENT
Start: 2023-01-01 | End: 2023-01-01

## 2023-01-01 RX ORDER — FUROSEMIDE 10 MG/ML
40 INJECTION INTRAMUSCULAR; INTRAVENOUS ONCE
Status: COMPLETED | OUTPATIENT
Start: 2023-01-01 | End: 2023-01-01

## 2023-01-01 RX ORDER — KETOROLAC TROMETHAMINE 15 MG/ML
15 INJECTION, SOLUTION INTRAMUSCULAR; INTRAVENOUS EVERY 6 HOURS PRN
Status: DISCONTINUED | OUTPATIENT
Start: 2023-01-01 | End: 2023-01-01 | Stop reason: HOSPADM

## 2023-01-01 RX ORDER — MONTELUKAST SODIUM 10 MG/1
10 TABLET ORAL NIGHTLY
Qty: 90 TABLET | Refills: 3 | Status: SHIPPED | OUTPATIENT
Start: 2023-01-01

## 2023-01-01 RX ORDER — MORPHINE SULFATE 4 MG/ML
4 INJECTION, SOLUTION INTRAMUSCULAR; INTRAVENOUS
Status: DISCONTINUED | OUTPATIENT
Start: 2023-01-01 | End: 2023-01-01

## 2023-01-01 RX ORDER — MORPHINE SULFATE 2 MG/ML
2 INJECTION, SOLUTION INTRAMUSCULAR; INTRAVENOUS
Status: DISCONTINUED | OUTPATIENT
Start: 2023-01-01 | End: 2023-01-01 | Stop reason: HOSPADM

## 2023-01-01 RX ORDER — MORPHINE SULFATE 2 MG/ML
2 INJECTION, SOLUTION INTRAMUSCULAR; INTRAVENOUS EVERY 6 HOURS PRN
Status: DISCONTINUED | OUTPATIENT
Start: 2023-01-01 | End: 2023-01-01

## 2023-01-01 RX ORDER — HEPARIN SODIUM 5000 [USP'U]/ML
5000 INJECTION, SOLUTION INTRAVENOUS; SUBCUTANEOUS 2 TIMES DAILY
Status: DISCONTINUED | OUTPATIENT
Start: 2023-01-01 | End: 2023-01-01

## 2023-01-01 RX ADMIN — CARVEDILOL 6.25 MG: 6.25 TABLET, FILM COATED ORAL at 23:03

## 2023-01-01 RX ADMIN — CARVEDILOL 6.25 MG: 6.25 TABLET, FILM COATED ORAL at 18:45

## 2023-01-01 RX ADMIN — METHYLPREDNISOLONE SODIUM SUCCINATE 40 MG: 40 INJECTION, POWDER, FOR SOLUTION INTRAMUSCULAR; INTRAVENOUS at 08:11

## 2023-01-01 RX ADMIN — DOXYCYCLINE HYCLATE 100 MG: 100 CAPSULE ORAL at 23:03

## 2023-01-01 RX ADMIN — HEPARIN SODIUM 5000 UNITS: 5000 INJECTION INTRAVENOUS; SUBCUTANEOUS at 23:03

## 2023-01-01 RX ADMIN — IPRATROPIUM BROMIDE AND ALBUTEROL SULFATE 1 AMPULE: .5; 3 SOLUTION RESPIRATORY (INHALATION) at 18:32

## 2023-01-01 RX ADMIN — IPRATROPIUM BROMIDE AND ALBUTEROL SULFATE 1 AMPULE: .5; 3 SOLUTION RESPIRATORY (INHALATION) at 00:24

## 2023-01-01 RX ADMIN — MORPHINE SULFATE 4 MG: 4 INJECTION, SOLUTION INTRAMUSCULAR; INTRAVENOUS at 16:51

## 2023-01-01 RX ADMIN — IPRATROPIUM BROMIDE AND ALBUTEROL SULFATE 1 AMPULE: .5; 3 SOLUTION RESPIRATORY (INHALATION) at 19:38

## 2023-01-01 RX ADMIN — MORPHINE SULFATE 4 MG: 4 INJECTION, SOLUTION INTRAMUSCULAR; INTRAVENOUS at 14:56

## 2023-01-01 RX ADMIN — FOLIC ACID 1 MG: 1 TABLET ORAL at 10:01

## 2023-01-01 RX ADMIN — IPRATROPIUM BROMIDE AND ALBUTEROL SULFATE 1 AMPULE: .5; 3 SOLUTION RESPIRATORY (INHALATION) at 07:54

## 2023-01-01 RX ADMIN — PANTOPRAZOLE SODIUM 40 MG: 40 TABLET, DELAYED RELEASE ORAL at 06:11

## 2023-01-01 RX ADMIN — SODIUM CHLORIDE, PRESERVATIVE FREE 10 ML: 5 INJECTION INTRAVENOUS at 08:11

## 2023-01-01 RX ADMIN — DOXYCYCLINE HYCLATE 100 MG: 100 CAPSULE ORAL at 09:57

## 2023-01-01 RX ADMIN — ASPIRIN 81 MG: 81 TABLET, COATED ORAL at 10:01

## 2023-01-01 RX ADMIN — CEFTRIAXONE 1000 MG: 1 INJECTION, POWDER, FOR SOLUTION INTRAMUSCULAR; INTRAVENOUS at 05:38

## 2023-01-01 RX ADMIN — CARVEDILOL 6.25 MG: 6.25 TABLET, FILM COATED ORAL at 10:07

## 2023-01-01 RX ADMIN — CEFTRIAXONE 1000 MG: 1 INJECTION, POWDER, FOR SOLUTION INTRAMUSCULAR; INTRAVENOUS at 07:50

## 2023-01-01 RX ADMIN — IPRATROPIUM BROMIDE AND ALBUTEROL SULFATE 1 AMPULE: .5; 3 SOLUTION RESPIRATORY (INHALATION) at 16:58

## 2023-01-01 RX ADMIN — FOLIC ACID 1 MG: 1 TABLET ORAL at 09:58

## 2023-01-01 RX ADMIN — SODIUM CHLORIDE, PRESERVATIVE FREE 10 ML: 5 INJECTION INTRAVENOUS at 22:09

## 2023-01-01 RX ADMIN — GUAIFENESIN 600 MG: 600 TABLET, EXTENDED RELEASE ORAL at 09:57

## 2023-01-01 RX ADMIN — DOXYCYCLINE HYCLATE 100 MG: 100 CAPSULE ORAL at 10:01

## 2023-01-01 RX ADMIN — SODIUM CHLORIDE, PRESERVATIVE FREE 10 ML: 5 INJECTION INTRAVENOUS at 09:58

## 2023-01-01 RX ADMIN — LORAZEPAM 1 MG: 2 INJECTION INTRAMUSCULAR at 16:51

## 2023-01-01 RX ADMIN — ISOSORBIDE MONONITRATE 30 MG: 30 TABLET, EXTENDED RELEASE ORAL at 09:58

## 2023-01-01 RX ADMIN — IPRATROPIUM BROMIDE AND ALBUTEROL SULFATE 1 AMPULE: .5; 3 SOLUTION RESPIRATORY (INHALATION) at 15:04

## 2023-01-01 RX ADMIN — LEVALBUTEROL HYDROCHLORIDE 0.63 MG: 0.63 SOLUTION RESPIRATORY (INHALATION) at 07:25

## 2023-01-01 RX ADMIN — MORPHINE SULFATE 2 MG: 2 INJECTION, SOLUTION INTRAMUSCULAR; INTRAVENOUS at 08:17

## 2023-01-01 RX ADMIN — GUAIFENESIN 600 MG: 600 TABLET, EXTENDED RELEASE ORAL at 22:09

## 2023-01-01 RX ADMIN — AMLODIPINE BESYLATE 10 MG: 10 TABLET ORAL at 09:57

## 2023-01-01 RX ADMIN — OXYCODONE HYDROCHLORIDE 2.5 MG: 5 TABLET ORAL at 15:26

## 2023-01-01 RX ADMIN — METHYLPREDNISOLONE SODIUM SUCCINATE 40 MG: 40 INJECTION, POWDER, FOR SOLUTION INTRAMUSCULAR; INTRAVENOUS at 06:10

## 2023-01-01 RX ADMIN — CARVEDILOL 6.25 MG: 6.25 TABLET, FILM COATED ORAL at 10:02

## 2023-01-01 RX ADMIN — AMLODIPINE BESYLATE 10 MG: 10 TABLET ORAL at 10:01

## 2023-01-01 RX ADMIN — IPRATROPIUM BROMIDE AND ALBUTEROL SULFATE 1 AMPULE: .5; 3 SOLUTION RESPIRATORY (INHALATION) at 11:23

## 2023-01-01 RX ADMIN — SODIUM CHLORIDE, PRESERVATIVE FREE 10 ML: 5 INJECTION INTRAVENOUS at 23:10

## 2023-01-01 RX ADMIN — CEFEPIME 2000 MG: 2 INJECTION, POWDER, FOR SOLUTION INTRAVENOUS at 18:34

## 2023-01-01 RX ADMIN — DOXYCYCLINE HYCLATE 100 MG: 100 CAPSULE ORAL at 22:09

## 2023-01-01 RX ADMIN — ISOSORBIDE MONONITRATE 30 MG: 30 TABLET, EXTENDED RELEASE ORAL at 14:24

## 2023-01-01 RX ADMIN — LEVOTHYROXINE SODIUM 75 MCG: 0.07 TABLET ORAL at 08:11

## 2023-01-01 RX ADMIN — GUAIFENESIN 600 MG: 600 TABLET, EXTENDED RELEASE ORAL at 14:24

## 2023-01-01 RX ADMIN — METHYLPREDNISOLONE SODIUM SUCCINATE 40 MG: 40 INJECTION, POWDER, FOR SOLUTION INTRAMUSCULAR; INTRAVENOUS at 00:35

## 2023-01-01 RX ADMIN — LEVOTHYROXINE SODIUM 75 MCG: 0.07 TABLET ORAL at 06:11

## 2023-01-01 RX ADMIN — LORAZEPAM 1 MG: 2 INJECTION INTRAMUSCULAR at 13:00

## 2023-01-01 RX ADMIN — VANCOMYCIN HYDROCHLORIDE 1250 MG: 10 INJECTION, POWDER, LYOPHILIZED, FOR SOLUTION INTRAVENOUS at 19:42

## 2023-01-01 RX ADMIN — METHYLPREDNISOLONE SODIUM SUCCINATE 40 MG: 40 INJECTION, POWDER, FOR SOLUTION INTRAMUSCULAR; INTRAVENOUS at 18:45

## 2023-01-01 RX ADMIN — FUROSEMIDE 40 MG: 10 INJECTION, SOLUTION INTRAMUSCULAR; INTRAVENOUS at 18:08

## 2023-01-01 RX ADMIN — METHYLPREDNISOLONE SODIUM SUCCINATE 40 MG: 40 INJECTION, POWDER, FOR SOLUTION INTRAMUSCULAR; INTRAVENOUS at 22:12

## 2023-01-01 ASSESSMENT — PAIN SCALES - GENERAL
PAINLEVEL_OUTOF10: 0
PAINLEVEL_OUTOF10: 9
PAINLEVEL_OUTOF10: 0
PAINLEVEL_OUTOF10: 3
PAINLEVEL_OUTOF10: 6
PAINLEVEL_OUTOF10: 0

## 2023-01-01 ASSESSMENT — ENCOUNTER SYMPTOMS
WHEEZING: 1
SHORTNESS OF BREATH: 1
DIARRHEA: 0
RHINORRHEA: 0
COUGH: 1
NAUSEA: 0
VOMITING: 0
ABDOMINAL PAIN: 0

## 2023-01-01 ASSESSMENT — LIFESTYLE VARIABLES
HOW MANY STANDARD DRINKS CONTAINING ALCOHOL DO YOU HAVE ON A TYPICAL DAY: PATIENT DOES NOT DRINK
HOW OFTEN DO YOU HAVE A DRINK CONTAINING ALCOHOL: NEVER
HOW OFTEN DO YOU HAVE A DRINK CONTAINING ALCOHOL: NEVER
HOW MANY STANDARD DRINKS CONTAINING ALCOHOL DO YOU HAVE ON A TYPICAL DAY: PATIENT DOES NOT DRINK

## 2023-01-01 ASSESSMENT — PAIN DESCRIPTION - ORIENTATION
ORIENTATION: MID;RIGHT;LEFT
ORIENTATION: RIGHT

## 2023-01-01 ASSESSMENT — PAIN - FUNCTIONAL ASSESSMENT
PAIN_FUNCTIONAL_ASSESSMENT: PREVENTS OR INTERFERES SOME ACTIVE ACTIVITIES AND ADLS
PAIN_FUNCTIONAL_ASSESSMENT: 0-10

## 2023-01-01 ASSESSMENT — PAIN DESCRIPTION - LOCATION
LOCATION: FOOT
LOCATION: CHEST

## 2023-01-01 ASSESSMENT — PAIN DESCRIPTION - DESCRIPTORS
DESCRIPTORS: ACHING;HEAVINESS;PRESSURE
DESCRIPTORS: CRAMPING

## 2023-01-04 ENCOUNTER — TELEPHONE (OUTPATIENT)
Dept: PULMONOLOGY | Age: 88
End: 2023-01-04

## 2023-01-04 DIAGNOSIS — R05.3 CHRONIC COUGH: Primary | ICD-10-CM

## 2023-01-04 DIAGNOSIS — J47.9 BRONCHIECTASIS WITHOUT COMPLICATION (HCC): ICD-10-CM

## 2023-01-04 RX ORDER — HYDROCODONE BITARTRATE AND HOMATROPINE METHYLBROMIDE ORAL SOLUTION 5; 1.5 MG/5ML; MG/5ML
5 LIQUID ORAL 4 TIMES DAILY PRN
Qty: 240 ML | Refills: 0 | Status: SHIPPED | OUTPATIENT
Start: 2023-01-04 | End: 2023-02-03

## 2023-01-04 NOTE — TELEPHONE ENCOUNTER
Patient is needing her cough medicine.     HYDROcodone homatropine (HYCODAN) 5-1.5 MG/5ML solution     Jeannine/Paulino Brooks/Rubi

## 2023-01-04 NOTE — TELEPHONE ENCOUNTER
I have reviewed the patient's controlled substance prescription history, as maintained in the [facility], so that the prescription for a controlled substance can be given.

## 2023-01-04 NOTE — TELEPHONE ENCOUNTER
Patient call requesting hycodan refill. Last Rx 11/28/22; [facility] last fill was 12/5/22 of 240mL. Please advise if can be filled.

## 2023-02-15 ENCOUNTER — TELEPHONE (OUTPATIENT)
Dept: PULMONOLOGY | Age: 88
End: 2023-02-15

## 2023-02-15 DIAGNOSIS — J47.9 BRONCHIECTASIS WITHOUT COMPLICATION (HCC): ICD-10-CM

## 2023-02-15 DIAGNOSIS — R05.3 CHRONIC COUGH: Primary | ICD-10-CM

## 2023-02-15 RX ORDER — HYDROCODONE BITARTRATE AND HOMATROPINE METHYLBROMIDE ORAL SOLUTION 5; 1.5 MG/5ML; MG/5ML
5 LIQUID ORAL 4 TIMES DAILY PRN
Qty: 300 ML | Refills: 0 | Status: SHIPPED | OUTPATIENT
Start: 2023-02-15 | End: 2023-03-17

## 2023-02-15 NOTE — TELEPHONE ENCOUNTER
I have reviewed the patient's controlled substance prescription history, as maintained in the [place] [facility], so that the prescription for a controlled substance can be given.    Rx sent to pharmacy as requested.

## 2023-02-15 NOTE — TELEPHONE ENCOUNTER
Patient needs prescription refill for cough syrup sent to [facility]. Hydrocodone. She can be reached at [phone].

## 2023-02-17 ENCOUNTER — HOSPITAL ENCOUNTER (EMERGENCY)
Age: 88
Discharge: HOME | End: 2023-02-17
Attending: EMERGENCY MEDICINE
Payer: MEDICARE

## 2023-02-17 ENCOUNTER — APPOINTMENT (OUTPATIENT)
Dept: GENERAL RADIOLOGY | Age: 88
End: 2023-02-17
Payer: MEDICARE

## 2023-02-17 VITALS
OXYGEN SATURATION: 92 % | SYSTOLIC BLOOD PRESSURE: 142 MMHG | HEART RATE: 88 BPM | TEMPERATURE: 98.6 F | HEIGHT: 65 IN | DIASTOLIC BLOOD PRESSURE: 68 MMHG | RESPIRATION RATE: 18 BRPM | BODY MASS INDEX: 18.99 KG/M2 | WEIGHT: 114 LBS

## 2023-02-17 DIAGNOSIS — R09.89 PULMONARY CONGESTION: Primary | ICD-10-CM

## 2023-02-17 LAB
ALBUMIN SERPL-MCNC: 2.9 G/DL (ref 3.2–4.6)
ALBUMIN/GLOB SERPL: 0.5 (ref 0.4–1.6)
ALP SERPL-CCNC: 64 U/L (ref 50–136)
ALT SERPL-CCNC: 20 U/L (ref 12–65)
ANION GAP SERPL CALC-SCNC: 5 MMOL/L (ref 2–11)
AST SERPL-CCNC: 26 U/L (ref 15–37)
BASOPHILS # BLD: 0 K/UL (ref 0–0.2)
BASOPHILS NFR BLD: 0 % (ref 0–2)
BILIRUB SERPL-MCNC: 0.5 MG/DL (ref 0.2–1.1)
BUN SERPL-MCNC: 39 MG/DL (ref 8–23)
CALCIUM SERPL-MCNC: 9.2 MG/DL (ref 8.3–10.4)
CHLORIDE SERPL-SCNC: 95 MMOL/L (ref 101–110)
CO2 SERPL-SCNC: 34 MMOL/L (ref 21–32)
CREAT SERPL-MCNC: 2.04 MG/DL (ref 0.6–1)
DIFFERENTIAL METHOD BLD: ABNORMAL
EKG ATRIAL RATE: 86 BPM
EKG DIAGNOSIS: NORMAL
EKG P AXIS: 67 DEGREES
EKG P-R INTERVAL: 168 MS
EKG Q-T INTERVAL: 368 MS
EKG QRS DURATION: 70 MS
EKG QTC CALCULATION (BAZETT): 440 MS
EKG R AXIS: 43 DEGREES
EKG T AXIS: 74 DEGREES
EKG VENTRICULAR RATE: 86 BPM
EOSINOPHIL # BLD: 0.2 K/UL (ref 0–0.8)
EOSINOPHIL NFR BLD: 3 % (ref 0.5–7.8)
ERYTHROCYTE [DISTWIDTH] IN BLOOD BY AUTOMATED COUNT: 13.1 % (ref 11.9–14.6)
FLUAV RNA SPEC QL NAA+PROBE: NOT DETECTED
FLUBV RNA SPEC QL NAA+PROBE: NOT DETECTED
GLOBULIN SER CALC-MCNC: 5.9 G/DL (ref 2.8–4.5)
GLUCOSE SERPL-MCNC: 97 MG/DL (ref 65–100)
HCT VFR BLD AUTO: 30.9 % (ref 35.8–46.3)
HGB BLD-MCNC: 9.8 G/DL (ref 11.7–15.4)
IMM GRANULOCYTES # BLD AUTO: 0 K/UL (ref 0–0.5)
IMM GRANULOCYTES NFR BLD AUTO: 0 % (ref 0–5)
LYMPHOCYTES # BLD: 0.3 K/UL (ref 0.5–4.6)
LYMPHOCYTES NFR BLD: 4 % (ref 13–44)
MAGNESIUM SERPL-MCNC: 2.5 MG/DL (ref 1.8–2.4)
MCH RBC QN AUTO: 28.5 PG (ref 26.1–32.9)
MCHC RBC AUTO-ENTMCNC: 31.7 G/DL (ref 31.4–35)
MCV RBC AUTO: 89.8 FL (ref 82–102)
MONOCYTES # BLD: 0.6 K/UL (ref 0.1–1.3)
MONOCYTES NFR BLD: 9 % (ref 4–12)
NEUTS SEG # BLD: 6.1 K/UL (ref 1.7–8.2)
NEUTS SEG NFR BLD: 84 % (ref 43–78)
NRBC # BLD: 0 K/UL (ref 0–0.2)
NT PRO BNP: 2233 PG/ML
PLATELET # BLD AUTO: 221 K/UL (ref 150–450)
PMV BLD AUTO: 8.1 FL (ref 9.4–12.3)
POTASSIUM SERPL-SCNC: 4.3 MMOL/L (ref 3.5–5.1)
PROT SERPL-MCNC: 8.8 G/DL (ref 6.3–8.2)
RBC # BLD AUTO: 3.44 M/UL (ref 4.05–5.2)
SARS-COV-2 RDRP RESP QL NAA+PROBE: NOT DETECTED
SODIUM SERPL-SCNC: 134 MMOL/L (ref 133–143)
SOURCE: NORMAL
TROPONIN I SERPL HS-MCNC: 34.5 PG/ML (ref 0–14)
WBC # BLD AUTO: 7.2 K/UL (ref 4.3–11.1)

## 2023-02-17 PROCEDURE — 99285 EMERGENCY DEPT VISIT HI MDM: CPT

## 2023-02-17 PROCEDURE — 87635 SARS-COV-2 COVID-19 AMP PRB: CPT

## 2023-02-17 PROCEDURE — 83735 ASSAY OF MAGNESIUM: CPT

## 2023-02-17 PROCEDURE — 96374 THER/PROPH/DIAG INJ IV PUSH: CPT

## 2023-02-17 PROCEDURE — 94640 AIRWAY INHALATION TREATMENT: CPT

## 2023-02-17 PROCEDURE — 80053 COMPREHEN METABOLIC PANEL: CPT

## 2023-02-17 PROCEDURE — 71045 X-RAY EXAM CHEST 1 VIEW: CPT

## 2023-02-17 PROCEDURE — 87502 INFLUENZA DNA AMP PROBE: CPT

## 2023-02-17 PROCEDURE — 6370000000 HC RX 637 (ALT 250 FOR IP): Performed by: EMERGENCY MEDICINE

## 2023-02-17 PROCEDURE — 94760 N-INVAS EAR/PLS OXIMETRY 1: CPT

## 2023-02-17 PROCEDURE — 93005 ELECTROCARDIOGRAM TRACING: CPT | Performed by: EMERGENCY MEDICINE

## 2023-02-17 PROCEDURE — 84484 ASSAY OF TROPONIN QUANT: CPT

## 2023-02-17 PROCEDURE — 85025 COMPLETE CBC W/AUTO DIFF WBC: CPT

## 2023-02-17 PROCEDURE — 2700000000 HC OXYGEN THERAPY PER DAY

## 2023-02-17 PROCEDURE — 6360000002 HC RX W HCPCS: Performed by: EMERGENCY MEDICINE

## 2023-02-17 PROCEDURE — 83880 ASSAY OF NATRIURETIC PEPTIDE: CPT

## 2023-02-17 RX ORDER — METHYLPREDNISOLONE SODIUM SUCCINATE 125 MG/2ML
60 INJECTION, POWDER, LYOPHILIZED, FOR SOLUTION INTRAMUSCULAR; INTRAVENOUS ONCE
Status: COMPLETED | OUTPATIENT
Start: 2023-02-17 | End: 2023-02-17

## 2023-02-17 RX ORDER — FUROSEMIDE 10 MG/ML
20 INJECTION INTRAMUSCULAR; INTRAVENOUS ONCE
Status: DISCONTINUED | OUTPATIENT
Start: 2023-02-17 | End: 2023-02-17 | Stop reason: HOSPADM

## 2023-02-17 RX ORDER — IPRATROPIUM BROMIDE AND ALBUTEROL SULFATE 2.5; .5 MG/3ML; MG/3ML
1 SOLUTION RESPIRATORY (INHALATION)
Status: COMPLETED | OUTPATIENT
Start: 2023-02-17 | End: 2023-02-17

## 2023-02-17 RX ADMIN — METHYLPREDNISOLONE SODIUM SUCCINATE 60 MG: 125 INJECTION, POWDER, FOR SOLUTION INTRAMUSCULAR; INTRAVENOUS at 17:51

## 2023-02-17 RX ADMIN — IPRATROPIUM BROMIDE AND ALBUTEROL SULFATE 1 AMPULE: 2.5; .5 SOLUTION RESPIRATORY (INHALATION) at 18:18

## 2023-02-17 ASSESSMENT — ENCOUNTER SYMPTOMS
COUGH: 0
SHORTNESS OF BREATH: 1
VOMITING: 0
NAUSEA: 0

## 2023-02-17 ASSESSMENT — PAIN SCALES - GENERAL: PAINLEVEL_OUTOF10: 0

## 2023-02-17 ASSESSMENT — PAIN - FUNCTIONAL ASSESSMENT: PAIN_FUNCTIONAL_ASSESSMENT: 0-10

## 2023-02-17 NOTE — ED TRIAGE NOTES
Patient brought into triage via wheelchair. Patient c\o shortness of breath that began 3 days. Patient also c\o tightness in her chest when she breaths. Patient states she wear oxygen at 3 L all the time.

## 2023-02-17 NOTE — ED PROVIDER NOTES
Emergency Department Provider Note                   PCP:                Garrick Burroughs DO               Age: 80 y.o. Sex: female       ICD-10-CM    1. Pulmonary congestion  R09.89           DISPOSITION Decision To Discharge 02/17/2023 07:31:29 PM       MDM  Number of Diagnoses or Management Options  Pulmonary congestion: established, worsening  Diagnosis management comments: Patient presents with exertional shortness of breath for 3 days. Work-up was started in triage. Patient's blood work shows slightly worsening CKD with a creatinine of 2.04, last comparable lab result 1.49, although she has been in this range in past.  She also has anemia but this appears stable. Patient was wheezing on exam, IV steroids and neb treatment ordered. However, chest x-ray does show pulmonary vascular congestion. Troponin, BNP, and viral swabs are added. BNP is elevated as 2233 and Troponin is just slightly elevated. Reviewed old Echo results and EF in 2022 was normal. I discussed results with patient and recommended observation for IV diuretics and further workup. The patient requested to go home and declined admission. In fact, at a later point in her stay she requested discharge so she could get home ASAP. She did think that she may have stopped taking her Lasix inadvertently as she had an older prescription that may have run out and she did not think she started her new bottle. The patient will restart her Lasix at home. I did recommend to the patient that if she was worse in any way, or not getting better, she needed to come back to the ED and she was in agreement.  Will discharge at patient's request.        Amount and/or Complexity of Data Reviewed  Clinical lab tests: ordered and reviewed  Tests in the radiology section of CPT®: ordered and reviewed  Tests in the medicine section of CPT®: ordered and reviewed  Discussion of test results with the performing providers: no  Decide to obtain previous medical records or to obtain history from someone other than the patient: no  Obtain history from someone other than the patient: no  Review and summarize past medical records: no  Discuss the patient with other providers: no  Independent visualization of images, tracings, or specimens: yes    Risk of Complications, Morbidity, and/or Mortality  Presenting problems: high  Diagnostic procedures: low  Management options: moderate    Patient Progress  Patient progress: stable             Orders Placed This Encounter   Procedures    COVID-19, Rapid    Influenza A/B, Molecular    XR CHEST PORTABLE    CBC with Auto Differential    Comprehensive Metabolic Panel    Magnesium    Troponin    Brain Natriuretic Peptide    EKG 12 Lead    Saline lock IV        Medications   methylPREDNISolone sodium (SOLU-MEDROL) injection 60 mg (60 mg IntraVENous Given 2/17/23 1751)   ipratropium-albuterol (DUONEB) nebulizer solution 1 ampule (1 ampule Inhalation Given 2/17/23 1818)       Discharge Medication List as of 2/17/2023  7:41 PM           Gabbi Schultz is a 80 y.o. female who presents to the Emergency Department with chief complaint of    Chief Complaint   Patient presents with    Shortness of Breath      Patient reports exertional shortness of breath for the past 3 days. She states she is on 3 L nasal cannula oxygen all the time at home. She has had no cough. She does have a history of COPD with wheezing. No fevers. The history is provided by the patient. No  was used. All other systems reviewed and are negative unless otherwise stated in the history of present illness section. Review of Systems   Constitutional:  Negative for chills and fever. Respiratory:  Positive for shortness of breath. Negative for cough. Cardiovascular:  Negative for chest pain and palpitations. Gastrointestinal:  Negative for nausea and vomiting.      Past Medical History:   Diagnosis Date    Allergic rhinitis     COPD (chronic obstructive pulmonary disease) (HCC)     Gastroesophageal reflux     Heel spur     treated with excision    History of bilateral cataract extraction     History of hemorrhoids     History of total hysterectomy     Hyperlipidemia     Hypothyroidism     Idiopathic scoliosis     Insomnia     Mammogram not needed 01/2012    wnl per pt, no longer needs d/t age        Past Surgical History:   Procedure Laterality Date    CATARACT REMOVAL  1996,2000    HEMORRHOID SURGERY      ORTHOPEDIC SURGERY  1992    HEEL SPUR     CHAGO AND BSO (CERVIX REMOVED)  1979        Family History   Problem Relation Age of Onset    Tuberculosis Father     Diabetes Brother     Heart Disease Mother         leaky valve        Social History     Socioeconomic History    Marital status:    Tobacco Use    Smoking status: Never     Passive exposure: Yes    Smokeless tobacco: Never    Tobacco comments:     Quit smoking: pt's  is former smoker   Substance and Sexual Activity    Alcohol use: No     Alcohol/week: 0.0 standard drinks    Drug use: Never        Allergies: Clarithromycin    Discharge Medication List as of 2/17/2023  7:41 PM        CONTINUE these medications which have NOT CHANGED    Details   HYDROcodone homatropine (HYCODAN) 5-1.5 MG/5ML solution Take 5 mLs by mouth 4 times daily as needed (chronic cough) for up to 30 days.  Max Daily Amount: 20 mLs, Disp-300 mL, R-0Normal      montelukast (SINGULAIR) 10 MG tablet Take 1 tablet by mouth nightly TAKE 1 TABLET BY MOUTH DAILY, Disp-90 tablet, R-3Requesting 1 year supplyNormal      amLODIPine (NORVASC) 10 MG tablet Take 1 tablet by mouth daily, Disp-90 tablet, R-1Normal      carvedilol (COREG) 6.25 MG tablet Take 1 tablet by mouth 2 times daily (with meals), Disp-180 tablet, R-0Normal      isosorbide mononitrate (IMDUR) 30 MG extended release tablet TAKE 1 TABLET BY MOUTH DAILY, Disp-90 tablet, R-3Normal      cilostazol (PLETAL) 100 MG tablet TAKE 1 TABLET BY MOUTH BEFORE BREAKFAST AND DINNER, Disp-60 tablet, R-11Normal      OXYGEN Inhale into the lungsHistorical Med      NONFORMULARY Mucus clearing device, flutter valveHistorical Med      Omega-3 1000 MG CAPS Take by mouthHistorical Med      folic acid (FOLVITE) 1 MG tablet Take 1 mg by mouth dailyHistorical Med      Coenzyme Q10 (CO Q-10) 100 MG CAPS Take by mouthHistorical Med      calcium carbonate 600 MG TABS tablet Take 1 tablet by mouth dailyHistorical Med      Ascorbic Acid (VITAMIN C) 250 MG tablet Take 250 mg by mouth dailyHistorical Med      BIOTIN PO Take 1 tablet by mouth dailyHistorical Med      albuterol sulfate  (90 Base) MCG/ACT inhaler Inhale 2 puffs into the lungs every 4 hours as neededHistorical Med      albuterol (PROVENTIL) (2.5 MG/3ML) 0.083% nebulizer solution USE 1 VIAL BY NEBULIZER EVERY 4 HOURS AS NEEDED FOR WHEEZING. Historical Med      aspirin 81 MG EC tablet Take 81 mg by mouth dailyHistorical Med      dextromethorphan (DELSYM) 30 MG/5ML extended release liquid Take 60 mg by mouthHistorical Med      ferrous sulfate (IRON 325) 325 (65 Fe) MG tablet Take 325 mg by mouth daily (with breakfast)Historical Med      fluticasone (FLONASE) 50 MCG/ACT nasal spray 1 spray by Nasal route dailyHistorical Med      furosemide (LASIX) 20 MG tablet Take 20 mg by mouth 2 times dailyHistorical Med      guaiFENesin (MUCINEX) 600 MG extended release tablet Take 1,200 mg by mouth as neededHistorical Med      levothyroxine (SYNTHROID) 75 MCG tablet Take 75 mcg by mouth every morning (before breakfast)Historical Med      Lutein 20 MG CAPS Take 1 tablet by mouth dailyHistorical Med      Magnesium 500 MG CAPS Take 1 tablet by mouth dailyHistorical Med      omeprazole (PRILOSEC) 20 MG delayed release capsule Take 20 mg by mouth dailyHistorical Med      raloxifene (EVISTA) 60 MG tablet Take 60 mg by mouth dailyHistorical Med      triamcinolone (KENALOG) 0.1 % cream The details of the medication are not available because there are pending changes by a home health clinician., Historical Med              Vitals signs and nursing note reviewed. Patient Vitals for the past 4 hrs:   Temp Pulse Resp BP   02/17/23 1957 98.6 °F (37 °C) 88 18 (!) 142/68          Physical Exam  Constitutional:       Appearance: Normal appearance. HENT:      Mouth/Throat:      Pharynx: Oropharynx is clear. Eyes:      Extraocular Movements: Extraocular movements intact. Pupils: Pupils are equal, round, and reactive to light. Cardiovascular:      Rate and Rhythm: Normal rate and regular rhythm. Pulses: Normal pulses. Heart sounds: Murmur heard. Pulmonary:      Effort: Pulmonary effort is normal.      Comments: Bilateral expiratory wheezes and coarse breath sounds noted with coughing. Abdominal:      General: Abdomen is flat. Bowel sounds are normal.      Palpations: Abdomen is soft. Musculoskeletal:         General: Normal range of motion. Cervical back: Normal range of motion and neck supple. Skin:     General: Skin is warm and dry. Neurological:      General: No focal deficit present. Mental Status: She is alert and oriented to person, place, and time.    Psychiatric:         Mood and Affect: Mood normal.        Procedures    ED EKG Interpretation  EKG was interpreted in the absence of a cardiologist.    Rate: 86  EKG Interpretation: EKG Interpretation: sinus rhythm  ST Segments: Normal ST segments - NO STEMI    Results for orders placed or performed during the hospital encounter of 02/17/23   COVID-19, Rapid    Specimen: Nasopharyngeal   Result Value Ref Range    Source Nasopharyngeal      SARS-CoV-2, Rapid Not detected NOTD     Influenza A/B, Molecular    Specimen: Not Specified   Result Value Ref Range    Influenza A, NINI Not detected NOTD      Influenza B, NINI Not detected NOTD     XR CHEST PORTABLE    Narrative    Portable chest:     History: Shortness of Breath    Comparison: 05/24/2022    Findings: A single view of the chest was obtained at 1544 hours. The cardiac silhouette is normal in size and configuration. Small right pleural  effusion persists. There is diffuse interstitial prominence and indistinctness  to the pulmonary vasculature representing an interval change. Impression    1. Interval development of pulmonary vascular congestion. 2. Unchanged right pleural effusion.      CBC with Auto Differential   Result Value Ref Range    WBC 7.2 4.3 - 11.1 K/uL    RBC 3.44 (L) 4.05 - 5.2 M/uL    Hemoglobin 9.8 (L) 11.7 - 15.4 g/dL    Hematocrit 30.9 (L) 35.8 - 46.3 %    MCV 89.8 82.0 - 102.0 FL    MCH 28.5 26.1 - 32.9 PG    MCHC 31.7 31.4 - 35.0 g/dL    RDW 13.1 11.9 - 14.6 %    Platelets 663 432 - 556 K/uL    MPV 8.1 (L) 9.4 - 12.3 FL    nRBC 0.00 0.0 - 0.2 K/uL    Differential Type AUTOMATED      Seg Neutrophils 84 (H) 43 - 78 %    Lymphocytes 4 (L) 13 - 44 %    Monocytes 9 4.0 - 12.0 %    Eosinophils % 3 0.5 - 7.8 %    Basophils 0 0.0 - 2.0 %    Immature Granulocytes 0 0.0 - 5.0 %    Segs Absolute 6.1 1.7 - 8.2 K/UL    Absolute Lymph # 0.3 (L) 0.5 - 4.6 K/UL    Absolute Mono # 0.6 0.1 - 1.3 K/UL    Absolute Eos # 0.2 0.0 - 0.8 K/UL    Basophils Absolute 0.0 0.0 - 0.2 K/UL    Absolute Immature Granulocyte 0.0 0.0 - 0.5 K/UL   Comprehensive Metabolic Panel   Result Value Ref Range    Sodium 134 133 - 143 mmol/L    Potassium 4.3 3.5 - 5.1 mmol/L    Chloride 95 (L) 101 - 110 mmol/L    CO2 34 (H) 21 - 32 mmol/L    Anion Gap 5 2 - 11 mmol/L    Glucose 97 65 - 100 mg/dL    BUN 39 (H) 8 - 23 MG/DL    Creatinine 2.04 (H) 0.6 - 1.0 MG/DL    Est, Glom Filt Rate 23 (L) >60 ml/min/1.73m2    Calcium 9.2 8.3 - 10.4 MG/DL    Total Bilirubin 0.5 0.2 - 1.1 MG/DL    ALT 20 12 - 65 U/L    AST 26 15 - 37 U/L    Alk Phosphatase 64 50 - 136 U/L    Total Protein 8.8 (H) 6.3 - 8.2 g/dL    Albumin 2.9 (L) 3.2 - 4.6 g/dL    Globulin 5.9 (H) 2.8 - 4.5 g/dL    Albumin/Globulin Ratio 0.5 0.4 - 1.6     Magnesium   Result Value Ref Range    Magnesium 2.5 (H) 1.8 - 2.4 mg/dL   Troponin   Result Value Ref Range    Troponin, High Sensitivity 34.5 (H) 0 - 14 pg/mL   Brain Natriuretic Peptide   Result Value Ref Range    NT Pro-BNP 2,233 (H) <450 PG/ML   EKG 12 Lead   Result Value Ref Range    Ventricular Rate 86 BPM    Atrial Rate 86 BPM    P-R Interval 168 ms    QRS Duration 70 ms    Q-T Interval 368 ms    QTc Calculation (Bazett) 440 ms    P Axis 67 degrees    R Axis 43 degrees    T Axis 74 degrees    Diagnosis Sinus rhythm with Premature atrial complexes         XR CHEST PORTABLE   Final Result   1. Interval development of pulmonary vascular congestion. 2. Unchanged right pleural effusion. Voice dictation software was used during the making of this note. This software is not perfect and grammatical and other typographical errors may be present. This note has not been completely proofread for errors.      Kathi Morocho MD  02/17/23 3965

## 2023-02-18 NOTE — ED NOTES
I have reviewed discharge instructions with the patient. The patient verbalized understanding. Patient left ED via Discharge Method: wheelchair to Home with family. Opportunity for questions and clarification provided. Patient given 0 scripts. To continue your aftercare when you leave the hospital, you may receive an automated call from our care team to check in on how you are doing. This is a free service and part of our promise to provide the best care and service to meet your aftercare needs.  If you have questions, or wish to unsubscribe from this service please call [phone]. Thank you for Choosing our [facility] Emergency Department.        Dean Danielle RN  02/17/23 1374

## 2023-02-18 NOTE — DISCHARGE INSTRUCTIONS
Take your Lasix as prescribed. Follow-up with your primary care provider. Return to the emergency department if you have any further problems or concerns.

## 2023-02-18 NOTE — ED NOTES
Handoff report received from Gurdeep Chimera, [place]. This RN to assume care.       Sherman Hernandez, MARY  02/17/23 9952

## 2023-02-28 RX ORDER — RALOXIFENE HYDROCHLORIDE 60 MG/1
60 TABLET, FILM COATED ORAL DAILY
Qty: 90 TABLET | Refills: 1 | Status: SHIPPED | OUTPATIENT
Start: 2023-02-28

## 2023-02-28 RX ORDER — CARVEDILOL 6.25 MG/1
TABLET ORAL
Qty: 180 TABLET | Refills: 0 | Status: SHIPPED | OUTPATIENT
Start: 2023-02-28

## 2023-02-28 NOTE — TELEPHONE ENCOUNTER
Requested Prescriptions     Pending Prescriptions Disp Refills    carvedilol (COREG) 6.25 MG tablet [Pharmacy Med Name: CARVEDILOL 6.25MG TABLETS] 180 tablet 0     Sig: TAKE 1 TABLET BY MOUTH TWICE DAILY WITH MEALS

## 2023-03-20 PROBLEM — N18.31 ACUTE RENAL FAILURE SUPERIMPOSED ON STAGE 3A CHRONIC KIDNEY DISEASE (HCC): Status: ACTIVE | Noted: 2023-01-01

## 2023-03-20 PROBLEM — J96.21 ACUTE ON CHRONIC RESPIRATORY FAILURE WITH HYPOXIA (HCC): Status: ACTIVE | Noted: 2023-01-01

## 2023-03-20 PROBLEM — J18.9 MULTIFOCAL PNEUMONIA: Status: ACTIVE | Noted: 2023-01-01

## 2023-03-20 PROBLEM — N17.9 ACUTE RENAL FAILURE SUPERIMPOSED ON STAGE 3A CHRONIC KIDNEY DISEASE (HCC): Status: ACTIVE | Noted: 2023-01-01

## 2023-03-20 PROBLEM — I27.20 PULMONARY HTN (HCC): Status: ACTIVE | Noted: 2022-01-01

## 2023-03-20 PROBLEM — I50.32 CHRONIC DIASTOLIC CONGESTIVE HEART FAILURE (HCC): Status: ACTIVE | Noted: 2022-01-01

## 2023-03-20 NOTE — PROGRESS NOTES
Patient placed on V60 CPAP:       03/20/23 1841   NIV Type   Skin Assessment Clean, dry, & intact   Suction Setup and Functional Yes   NIV Started/Stopped On   Equipment Type V60 CPAP   Settings/Measurements   PIP Observed 16 cm H20   CPAP/EPAP 14 cmH2O   Vt (Measured) 314 mL   Resp 29   FiO2  50 %   Minute Volume (L/min) 11.4 Liters   SpO2 98   Alarm Settings   Alarms On Y   Low Pressure (cmH2O) 5 cmH2O   High Pressure (cmH2O) 30 cmH2O   Delay Alarm 20 sec(s)   Apnea (secs) 20 secs   RR Low (bpm) 8   RR High (bpm) 50 br/min

## 2023-03-20 NOTE — ED NOTES
Pt currently on BiPAP due to not tolerating other O2 devices well. Patient satting at 100% at this moment.       Selestine Prader, RN  03/20/23 1929

## 2023-03-20 NOTE — ED PROVIDER NOTES
elevated RVSP. Technical qualifiers: Echo study was technically difficult due to patient's body habitus. Category 2:   I independently ordered and interpreted the EKG. I independently ordered and interpreted the X-rays. Chest x-ray worsening multifocal pneumonia. Stable moderate right pleural effusion. In agreement with radiologist interpretation. Category 3: Discussion of management or test interpretation. 79 yo F w/ hx of COPD on 3-4L O2 via NC at baseline, CHF, CKD here w/ worsening SOB, cough. Worsening bilat LE edema over the past several days. Hypoxic on EMS arrival. Tachypneic with wheezes and rales. Given Duoneb + Solumderol en route w/ EMS and placed on NRB. Tachypneic, hypoxic. Blood pressure stable. Afebrile. CXR w/ multifocal PNA. WBC 13.7. Cr 2.17. ProBNP 1,844. H&H stable. Duoneb, Lasix 40 mg IV given, Cefepime + Vancomycin given. COVID/Flu negative. Pt currently on heated high flow at 10L. ABG w/ pH 7.39, pCO2 57.9, pO2 46, HCO3 34.9. Additional DuoNeb ordered. Holding off on IV fluid hydration as patient appears volume overloaded 1-2 worsens patient respiratory status. Hospitalist consulted for admission. Patient potentially may need to be placed on BiPAP. Patient states that she does not have a living will. Patient placed on BiPAP and is tolerating much better at this time. Hospitalist at  requesting that patient be transferred to downtown ICU. Discussed case with Dr. Neville Zuniga on-call for pulmonary who has excepted patient. ED Course as of 03/20/23 1854   Mon Mar 20, 2023   1732 Portable Chest X-ray FINDINGS:     Hardware:  None present. Lung Parenchyma: There is worsening multifocal lung consolidation. Increased in   both upper lobes and left lower lobe. Stable and right lower lobe. Pleura: Stable moderate right pleural effusion. Cardiomediastinal contour: Cardiac silhouette unremarkable. Mediastinal contour   unremarkable. Bones:Unremarkable.      IMPRESSION: (PRILOSEC) 20 MG DELAYED RELEASE CAPSULE    Take 20 mg by mouth daily    OXYGEN    Inhale into the lungs    RALOXIFENE (EVISTA) 60 MG TABLET    Take 1 tablet by mouth daily    TRIAMCINOLONE (KENALOG) 0.1 % CREAM    The details of the medication are not available because there are pending changes by a home health clinician.         Results for orders placed or performed during the hospital encounter of 03/20/23   Critical Care    Narrative    Roshan Cabrera MD     3/20/2023  6:17 PM  Critical Care  Performed by: Donice Hamman., MD  Authorized by: Donice Hamman., MD     Critical care provider statement:     Critical care time (minutes):  72    Critical care time was exclusive of:  Separately billable procedures and   treating other patients    Critical care was necessary to treat or prevent imminent or   life-threatening deterioration of the following conditions:  Respiratory   failure, CNS failure or compromise and sepsis    Critical care was time spent personally by me on the following   activities:  Development of treatment plan with patient or surrogate,   discussions with primary provider, evaluation of patient's response to   treatment, examination of patient, obtaining history from patient or   surrogate, ordering and performing treatments and interventions, ordering   and review of laboratory studies, ordering and review of radiographic   studies, pulse oximetry, re-evaluation of patient's condition and review   of old charts    I assumed direction of critical care for this patient from another   provider in my specialty: no      Care discussed with: admitting provider     COVID-19, Rapid    Specimen: Nasopharyngeal   Result Value Ref Range    Source Nasopharyngeal      SARS-CoV-2, Rapid Not detected NOTD     Influenza A/B, Molecular    Specimen: Nasal   Result Value Ref Range    Influenza A, NINI Not detected      Influenza B, NINI Not detected     XR CHEST PORTABLE    Narrative

## 2023-03-20 NOTE — ED NOTES
Ventura Doss - sister    Rad Wilson.  Magnolia Phillip, [address] [place] [address]  03/20/23 3022

## 2023-03-20 NOTE — ED TRIAGE NOTES
Pt arrives [facility] EMS from home. Pt c/o SOB worsening over past few hours. Hx asthma and COPD. Pt tried inhaler at home w/o relief. Pt wears 4L NC regularly. Pt placed on NR at 10L w/EMS d/t sats 84% on arrival at home and O2 at 3L. Pt given 1 albuterol tx and 125 mg solumedrol en route via IV acces.

## 2023-03-21 NOTE — PROGRESS NOTES
Physician Progress Note      PATIENT:               Jacque Esteban  CSN #:                  [id number]  :                       1931  ADMIT DATE:       3/20/2023 4:42 PM  [address] [place] DATE:  RESPONDING  PROVIDER #:        Brady Hines MD          QUERY TEXT:    Pt admitted with pneumonia. Pt noted to have respiratory failure. If possible,   please document in the progress notes and discharge summary if you are   evaluating and /or treating any of the following: The medical record reflects the following:  Risk Factors: Acute on chronic hypoxemic respiratory failure  Clinical Indicators: 3/20 PN \"Multifocal pneumonia: Initiated on antibiotics   with ceftriaxone, doxycycline, will monitor respiratory status. \"  Labs POA-- WBC 13.7, LA 1.5, VS POA- HR greater than 90, RR greater than 20  Treatment: BiPAP, IV abx  Options provided:  -- Sepsis, present on admission  -- Sepsis was ruled out  -- Other - I will add my own diagnosis  -- Disagree - Not applicable / Not valid  -- Disagree - Clinically unable to determine / Unknown  -- Refer to Clinical Documentation Reviewer    PROVIDER RESPONSE TEXT:    This patient has sepsis which was present on admission.     Query created by: Courtney Baires on 3/21/2023 11:57 AM      Electronically signed by:  Brady Hines MD 3/21/2023 12:15 PM

## 2023-03-21 NOTE — FLOWSHEET NOTE
03/21/23 1650 03/21/23 1651 03/21/23 1652   Vitals   Heart Rate (!) 101 (!) 102 (!) 102   Resp 26 30 30   BP  --   --   --    MAP (Calculated)  --   --   --    MAP (mmHg)  --   --   --    Oxygen Therapy   SpO2 (!) 89 % (!) 81 % (!) 80 %   Pulse via Oximetry 102 beats per minute 101 beats per minute 100 beats per minute   FiO2   --  50 % 55 %   O2 Flow Rate (L/min) 40 L/min 40 L/min 40 L/min      03/21/23 1653 03/21/23 1654 03/21/23 1655   Vitals   Heart Rate (!) 102 (!) 102 (!) 102   Resp 25 28 28   BP  --   --   --    MAP (Calculated)  --   --   --    MAP (mmHg)  --   --   --    Oxygen Therapy   SpO2 (!) 78 % (!) 77 % (!) 81 %   Pulse via Oximetry 102 beats per minute 101 beats per minute 102 beats per minute   FiO2  60 % 65 % 70 %   O2 Flow Rate (L/min) 40 L/min 40 L/min 40 L/min      03/21/23 1656 03/21/23 1657 03/21/23 1658   Vitals   Heart Rate (!) 101 (!) 101 (!) 101   Resp 26 26 26   BP  --   --   --    MAP (Calculated)  --   --   --    MAP (mmHg)  --   --   --    Oxygen Therapy   SpO2 (!) 85 % (!) 86 % (!) 88 %   Pulse via Oximetry 101 beats per minute 101 beats per minute 102 beats per minute   FiO2  75 % 80 % 85 %   O2 Flow Rate (L/min) 40 L/min 40 L/min 40 L/min      03/21/23 1659 03/21/23 1700   Vitals   Heart Rate (!) 102 (!) 103   Resp 25 21   BP  --  (!) 179/64   MAP (Calculated)  --  102   MAP (mmHg)  --  97   Oxygen Therapy   SpO2 (!) 89 % 90 %   Pulse via Oximetry 101 beats per minute 103 beats per minute   FiO2  90 % 100 %   O2 Flow Rate (L/min) 40 L/min 40 L/min   Patient noted to be coughing up more blood this afternoon with thick secretions. O2 satuation dropping. Multiple tissues saturated with spontaneously coughed up blood. RT notified as Airvo titrated up for decreased O2 sat down to 77% with appropriate waveform. Dr. Aniya Madrigal also notified and came to bedside. STAT CXR ordered.

## 2023-03-21 NOTE — H&P
COPD: Initiated on steroids, already on antibiotics, will monitor respiratory status. chronic diastolic congestive heart failure: Suspect underlying heart failure as well contributing to patient's symptoms, will continue home dose of Lasix. Patient is baseline proBNP is 2200, currently 1800. Hypothyroidism: Continue on levothyroxine. Diet: ADULT DIET; Regular  VTE prophylaxis: Lovenox  Code status: Full Code    Hospital Problems:  Principal Problem:    Multifocal pneumonia  Active Problems:    Acute renal failure superimposed on stage 3a chronic kidney disease (HCC)    Acute on chronic respiratory failure with hypoxia (HCC)    Chronic diastolic congestive heart failure (HCC)    Pulmonary HTN ([address]  Resolved Problems:    * No resolved hospital problems.  *       Past History:     Past Medical History:   Diagnosis Date    Allergic rhinitis     COPD (chronic obstructive pulmonary disease) (HCC)     Gastroesophageal reflux     Heel spur     treated with excision    History of bilateral cataract extraction     History of hemorrhoids     History of total hysterectomy     Hyperlipidemia     Hypothyroidism     Idiopathic scoliosis     Insomnia     Mammogram not needed 01/2012    wnl per pt, no longer needs d/t age       Past Surgical History:   Procedure Laterality Date    CATARACT REMOVAL  1996,2000    HEMORRHOID SURGERY      ORTHOPEDIC SURGERY  1992    HEEL SPUR     CHAGO AND BSO (CERVIX REMOVED)  1979        Social History     Tobacco Use    Smoking status: Never     Passive exposure: Yes    Smokeless tobacco: Never    Tobacco comments:     Quit smoking: pt's  is former smoker   Substance Use Topics    Alcohol use: No     Alcohol/week: 0.0 standard drinks      Social History     Substance and Sexual Activity   Drug Use Never       Family History   Problem Relation Age of Onset    Tuberculosis Father     Diabetes Brother     Heart Disease Mother         leaky valve        Immunization History (SINGULAIR) 10 mg, Oral, NIGHTLY, TAKE 1 TABLET BY MOUTH DAILY    NONFORMULARY Mucus clearing device, flutter valve<BR>    Omega-3 1000 MG CAPS Oral    omeprazole (PRILOSEC) 20 mg, Oral, DAILY    OXYGEN Inhalation    raloxifene (EVISTA) 60 mg, Oral, DAILY    triamcinolone (KENALOG) 0.1 % cream The details of the medication are not available because there are pending changes by a home health clinician. vitamin C 250 mg, Oral, DAILY         Objective:   Patient Vitals for the past 24 hrs:   Temp Pulse Resp BP SpO2   03/20/23 2207 -- -- (!) 34 -- --   03/20/23 2205 96.8 °F (36 °C) -- -- -- --   03/20/23 1930 -- 87 26 (!) 148/46 100 %   03/20/23 1841 -- -- 29 -- --   03/20/23 1832 -- 90 29 -- 99 %   03/20/23 1820 -- -- -- -- (!) 71 %   03/20/23 1808 -- 95 25 (!) 152/54 94 %   03/20/23 1740 -- 94 (!) 32 -- 98 %   03/20/23 1715 -- 96 (!) 32 -- 95 %   03/20/23 1714 -- 95 (!) 35 -- 95 %   03/20/23 1707 -- 94 20 -- 96 %   03/20/23 1647 97.7 °F (36.5 °C) 95 20 (!) 163/47 99 %       Oxygen Therapy  SpO2: 100 %  Pulse via Oximetry: 95 beats per minute  Pulse Oximeter Device Mode: Intermittent  O2 Device: PAP (positive airway pressure) (BiPAP)  Skin Assessment: Clean, dry, & intact  Skin Protection for O2 Device: N/A  FiO2 : 45 %  O2 Flow Rate (L/min): 15 L/min  Blood Gas  Performed?: Yes  Colin's Test #1: Not assessed  Site #1: Right Brachial  Site Prepped #1: Yes  Number of Attempts #1: 1  Pressure Held #1: Yes  Complications #1: None  Post-procedure #1: Standard  Specimen Status #1: Point of care  How Tolerated?: Tolerated well    Estimated body mass index is 18.67 kg/m² as calculated from the following:    Height as of this encounter: 5' 5\" (1.651 m). Weight as of this encounter: 112 lb 3.4 oz (50.9 kg). No intake or output data in the 24 hours ending 03/20/23 8917      Physical Exam:    Blood pressure (!) 148/46, pulse 87, temperature 96.8 °F (36 °C), temperature source Axillary, resp.  rate (!) 34, height 5' 5\"

## 2023-03-21 NOTE — ACP (ADVANCE CARE PLANNING)
[facility] Hospitalist Service  At the heart of better care     Advance Care Planning   Admit Date:  3/20/2023  4:42 PM   Name:  Dillan Lora   Age:  80 y.o. Sex:  female  :  1931   MRN:  [id number]   Room:  [facility]/    Dillan Lora is able to make her own decisions:   Yes    If pt unable to make decisions, POA/surrogate decision maker:  SisterSeth     Other people present: This physician     Patient / surrogate decision-maker directed code status:  DNR/DNI    Other ACP topics discussed, if applicable:   Treatment of respiratory failure     Patient or surrogate consented to discussion of the current conditions, workup, management plans, prognosis, and the risk for further deterioration. Time spent: 18 minutes in direct discussion.       Signed:  Annette Jacobson MD

## 2023-03-21 NOTE — PLAN OF CARE
Problem: Discharge Planning  Goal: Discharge to home or other facility with appropriate resources  Outcome: Progressing  Flowsheets  Taken 3/20/2023 2300  Discharge to home or other facility with appropriate resources:   Identify barriers to discharge with patient and caregiver   Arrange for needed discharge resources and transportation as appropriate   Identify discharge learning needs (meds, wound care, etc)   Arrange for interpreters to assist at discharge as needed   Refer to discharge planning if patient needs post-hospital services based on physician order or complex needs related to functional status, cognitive ability or social support system  Taken 3/20/2023 2254  Discharge to home or other facility with appropriate resources:   Identify barriers to discharge with patient and caregiver   Identify discharge learning needs (meds, wound care, etc)   Refer to discharge planning if patient needs post-hospital services based on physician order or complex needs related to functional status, cognitive ability or social support system   Arrange for needed discharge resources and transportation as appropriate   Arrange for interpreters to assist at discharge as needed     Problem: Pain  Goal: Verbalizes/displays adequate comfort level or baseline comfort level  Outcome: Progressing  Flowsheets (Taken 3/20/2023 2300)  Verbalizes/displays adequate comfort level or baseline comfort level:   Encourage patient to monitor pain and request assistance   Assess pain using appropriate pain scale   Administer analgesics based on type and severity of pain and evaluate response   Implement non-pharmacological measures as appropriate and evaluate response   Consider cultural and social influences on pain and pain management   Notify Licensed Independent Practitioner if interventions unsuccessful or patient reports new pain     Problem: Safety - Adult  Goal: Free from fall injury  Outcome: Progressing     Problem: ABCDS Injury Assessment  Goal: Absence of physical injury  Outcome: Progressing

## 2023-03-21 NOTE — CARE COORDINATION
03/21/23 1450   Service Assessment   Patient Orientation Alert and Oriented;Person;Place;Situation;Self   Cognition Alert   History Provided By Patient   Primary Caregiver Self   Support Systems Family Members  (Sister Taryn Bardales [phone])   [facility] is: Legal Next of Ruchi Hyatt   PCP Verified by CM Yes   Last Visit to PCP   (states been a long time since saw Dr. Joni Delcid due to transportation issues)   Prior Functional Level Independent in ADLs/IADLs   Can patient return to prior living arrangement Unknown at present   Ability to make needs known: Good   Family able to assist with home care needs: No   Financial Resources Medicare   CM/SW Referral Other (see comment)  (D/C planning)   Social/Functional History   Lives With Alone   Type of [address]  (reports recently feeling weaker at home)   [address] [facility]  (states her legs at times feel like going out from her and she borrowed a walker from her sister)   Active  No   Patient's  Info States her neighbors try to help her and her niece but states niece recenlty passed away   Discharge Planning   Type of Residence [place] [employer]   Living Arrangements Alone   Current Services Prior To Admission None   Potential [address] Medications No   Type of [place] [facility] Discharge   Transition of [address] (CM Consult) Discharge Planning   Condition of Participation: Discharge [address] for Transition of Care is related to the following treatment goals: Pending clinical progress may need rehab   Interdisciplinary team meeting with attending, CM, nursing, PT and nutritional services for plan of care CM met with patient for d/c planning. Patient alert and oriented x 3, independent of ADL's and lives alone.  She states that she has no children as her daughter passed away several years ago. She was receiving assistance from her niece but states she recently passed away. She states only family member she has is her sister Aditi Santiago [phone] who she states is declining in health. DME includes Oxygen from [facility] Jan 2022 and this was confirmed with Amanda that she is current with them. She states recently she has noticed weakness in her legs and had to borrow a walker from her sister to help her walk as she felt like her legs would give way. She states has neighbors that help her with her meals and transportation as available. She has a Yorkie dog that her neighbor is watching while she is in the hospital. She receives Meals on Wheels at home. She confirms she has [facility] Medicare and obtains medications at [place]. She has PCP Dr Arash Beach but states that she has not seen him in a long time due to transportation issues. Discussed options for [facility] calls with her and she states that sounds like a good possibility. She is not medically stable to participate with PT/OT today. Patient may need rehab and she is agreeable if needs rehab. D/C plan pending clinical progress. CM following.

## 2023-03-21 NOTE — NURSE NAVIGATOR
CHF teaching completed to Pt's family @ BS. CHF background completed. Teaching emphasis on Call Cardiology STAT ,if any of the following are noted:  Short of Breath; with activity or without/ while reclined, Generalize weakness, edema are noted individually or grouped. Cardiac Diet  and salt/fluid restrictions covered. Daily WTs emphasized. Planner with summarization sheet provided with cardiology service and phone number and bathroom scale offered. Total time @ BS is 1 hour and pt verbalize understanding/past post test.  Pt instructed to call myself, if any questions occur. Will follow.

## 2023-03-21 NOTE — ED NOTES
TRANSFER - OUT REPORT:    Verbal report given to Landon Lucio 82 on Marcheta Range  being transferred to [facility] ICU [address] 80 22 97 for routine progression of patient care       Report consisted of patient's Situation, Background, Assessment and   Recommendations(SBAR). Information from the following report(s) ED SBAR was reviewed with the receiving nurse. Lennox Assessment: Presents to emergency department  because of falls (Syncope, seizure, or loss of consciousness): No, Age > 79: No, Altered Mental Status, Intoxication with alcohol or substance confusion (Disorientation, impaired judgment, poor safety awaremess, or inability to follow instructions): No, Impaired Mobility: Ambulates or transfers with assistive devices or assistance; Unable to ambulate or transer.: No, Nursing Judgement: No  Lines:   Peripheral IV 03/20/23 Left Antecubital (Active)       Peripheral IV 03/20/23 Right Antecubital (Active)   Site Assessment Clean, dry & intact 03/20/23 1823   Line Status Normal saline locked;Specimen collected; Flushed;Capped;Brisk blood return 03/20/23 1823   Phlebitis Assessment No symptoms 03/20/23 1823   Infiltration Assessment 0 03/20/23 1823   Dressing Status Clean, dry & intact; New dressing applied 03/20/23 1823   Dressing Type Transparent 03/20/23 1823   Dressing Intervention New 03/20/23 1823        Opportunity for questions and clarification was provided.       Patient transported with:  Monitor, O2 @ 9.8lpm and Registered Nurse           Sachin Humphries RN  03/20/23 3974

## 2023-03-21 NOTE — PROGRESS NOTES
Nurse notified me that the patient has hemoptysis. I came to see the patient right away. BP (!) 179/64   Pulse (!) 103   Temp 97.9 °F (36.6 °C) (Temporal)   Resp 21   Ht 5' 5\" (1.651 m)   Wt 112 lb 3.4 oz (50.9 kg)   SpO2 90%   BMI 18.67 kg/m²      Patient is alert oriented. She is sitting up in her bed. Alert oriented. She is on high flow oxygen. I witnessed blood on napkins that she used. No clots. Chest showed decreased breath sounds bilaterally. Impression  Hemoptysis  Check chest x-ray stat  Hold aspirin  Hold Pletal  Monitor vital signs  Heparin was on hold since the morning. I discontinue it. May need pulmonary consultation. Monitor closely    Addendum :   I reviewed CXR this evening. No change from baseline, from my reading. Will continue to monitor. Check official reading. Consider pulmonary service consultation tomorrow.

## 2023-03-21 NOTE — WOUND CARE
Spoke with primary nurse regarding patient sacral wound. She stated it is shallow and pink without much drainage, no odor. Patient has silicone foam dressing in place. This is appropriate, order written. Recommend keeping turned side to side off area and float heels. Note patient is very thin and at risk. May consider low air loss bed if she can not be kept off sacrum. Wound team will continue to follow.

## 2023-03-21 NOTE — PROGRESS NOTES
Hospitalist Progress Note   Admit Date:  3/20/2023  4:42 PM   Name:  Ayla Kevin   Age:  80 y.o. Sex:  female  :  1931   MRN:  [id number]   Room:  [facility]/    Presenting Complaint: Shortness of Breath     Reason(s) for Admission: Acute on chronic respiratory failure with hypoxia and hypercapnia (HCC) [J96.21, J96.22]  Hypervolemia, unspecified hypervolemia type [E87.70]  Multifocal pneumonia [J18.9]  Acute on chronic congestive heart failure, unspecified heart failure type [facility]) [I50.9]     Hospital Course:   Ayla Kevin is a 80 y.o. female with medical history of   COPD on 3-4 L/min of oxygen at baseline   CHF with diastolic dysfunction  CKD stage III     who presented with shortness of breath for 4 days prior to admission. Patient had wheezing, cough, sputum production     Patient denies fever, chills, vomiting. In ER patient had white blood cell 13.7, creatinine 2.7, proBNP is 1844. COVID and flu testing were negative. Initially patient was placed on CPAP to help with work of breathing. Chest x-ray shows multifocal pneumonia. Patient was started on empiric antibiotics, including ceftriaxone and doxycycline. Subjective & 24hr Events (23):     3/21/23   Patient is feeling better now. She is on cannula now. BiPAP was removed. She is more comfortable. She is telling me that she does not want to be a full code. Please refer to my ACP note. No chest pain. Less shortness of breath. No abnormal movements. No localized weakness. Assessment & Plan:     Principal Problem:    Multifocal pneumonia    Acute on chronic respiratory failure with hypoxia ([address]    Pulmonary HTN ([facility]  Plan:   Continue current empiric antibiotics.    On DuoNeb  Oxygen cannula  Wean oxygen as tolerated  Methylprednisolone 40 mg IV every 8 hours  Montelukast 10 mg po q day     Active Problems:    Acute renal failure superimposed on stage 3a chronic kidney disease ([address]  Plan: (NORVASC) tablet 10 mg  10 mg Oral Daily    aspirin EC tablet 81 mg  81 mg Oral Daily    carvedilol (COREG) tablet 6.25 mg  6.25 mg Oral BID WC    folic acid (FOLVITE) tablet 1 mg  1 mg Oral Daily    levothyroxine (SYNTHROID) tablet 75 mcg  75 mcg Oral QAM AC    methylPREDNISolone sodium (SOLU-MEDROL) injection 40 mg  40 mg IntraVENous Q8H       Signed:  Gerhardt Bass, MD    Part of this note may have been written by using a voice dictation software. The note has been proof read but may still contain some grammatical/other typographical errors.

## 2023-03-21 NOTE — PROGRESS NOTES
Patient refused tuberculin skin test stating that she had childhood exposure to TB from her father. She states she was told she could not have TB skin tests and that she could only do the CXR to rule out TB. Notified Dr. Christopher Carballo and  Twyla Segal. Per Darwin Singh, CM patient will need to have a CXR ordered closer to d/c.

## 2023-03-21 NOTE — PROGRESS NOTES
03/20/23 2207   NIV Type   Skin Assessment Clean, dry, & intact   Skin Protection for O2 Device N/A   Suction Setup and Functional Yes   NIV Started/Stopped On   Equipment Type V60   Mode CPAP   Mask Type Full face mask   Mask Size Medium   Bonnet size Medium   Settings/Measurements   PIP Observed 16 cm H20   CPAP/EPAP 14 cmH2O   Vt (Measured) 365 mL   Resp (!) 34   FiO2  45 %   Minute Volume (L/min) 12.4 Liters   Comfort Level Good   Using Accessory Muscles No   SpO2 100   Patient's Home Machine No   Alarm Settings   Alarms On Y   Low Pressure (cmH2O) 5 cmH2O   High Pressure (cmH2O) 30 cmH2O   Delay Alarm 20 sec(s)   Apnea (secs) 20 secs   RR Low (bpm) 8   RR High (bpm) 50 br/min   Patient Transport   Time Spent Transporting 1-15   Transport Ventillation Type Other  (100% NRB)   Transport From ER   Transport Destination ICU   Breath Sounds   Right Upper Lobe Diminished   Right Middle Lobe Diminished   Right Lower Lobe Diminished   Left Upper Lobe Diminished   Left Lower Lobe Diminished   Patient Observation   Observations Patient comfortable on V60 after transfer to ICU   Patient on CPAP via V60 with medium full mask after transfer from ED.

## 2023-03-21 NOTE — ED NOTES
TRANSFER - OUT REPORT:    Verbal report given to Renetta Roper on Vero Valdes  being transferred to [facility] CCU 3308 for routine progression of patient care       Report consisted of patient's Situation, Background, Assessment and   Recommendations(SBAR). Information from the following report(s) ED SBAR was reviewed with the receiving nurse. [place] Assessment: Presents to emergency department  because of falls (Syncope, seizure, or loss of consciousness): No, Age > 79: No, Altered Mental Status, Intoxication with alcohol or substance confusion (Disorientation, impaired judgment, poor safety awaremess, or inability to follow instructions): No, Impaired Mobility: Ambulates or transfers with assistive devices or assistance; Unable to ambulate or transer.: No, Nursing Judgement: No  Lines:   Peripheral IV 03/20/23 Left Antecubital (Active)       Peripheral IV 03/20/23 Right Antecubital (Active)   Site Assessment Clean, dry & intact 03/20/23 1823   Line Status Normal saline locked;Specimen collected; Flushed;Capped;Brisk blood return 03/20/23 1823   Phlebitis Assessment No symptoms 03/20/23 1823   Infiltration Assessment 0 03/20/23 1823   Dressing Status Clean, dry & intact; New dressing applied 03/20/23 1823   Dressing Type Transparent 03/20/23 1823   Dressing Intervention New 03/20/23 1823        Opportunity for questions and clarification was provided.       Patient transported with                     Al Foss RN  03/20/23 8961

## 2023-03-22 NOTE — PROGRESS NOTES
Physician Progress Note      PATIENT:               Ravi Solis  CSN #:                  [id number]  :                       1931  ADMIT DATE:       3/20/2023 4:42 PM  [address] DATE:  RESPONDING  PROVIDER #:        Cain Lundborg MD          QUERY TEXT:    Pt admitted with sepsis. Pt noted to have pneumonia. If possible, please   document in the progress notes and discharge summary if you are evaluating   and/or treating any of the following:    Note: CAP and HCAP indicate where the pneumonia was acquired, not a specific   type. The medical record reflects the following:  Risk Factors: Acute exacerbation of COPD, cough, SOB, sputum production, new   hemoptysis  Clinical Indicators per H&P:  Acute on chronic hypoxemic respiratory failure: Placed on BiPAP  X-ray shows multifocal pneumonia  Treatment: ceftriaxone, doxycycline  Options provided:  -- Gram negative pneumonia  -- Gram positive pneumonia  -- MRSA pneumonia  -- MSSA pneumonia  -- Viral pneumonia  -- Aspiration pneumonia  -- Hypostatic pneumonia  -- Other - I will add my own diagnosis  -- Disagree - Not applicable / Not valid  -- Disagree - Clinically unable to determine / Unknown  -- Refer to Clinical Documentation Reviewer    PROVIDER RESPONSE TEXT:    This patient has gram negative pneumonia.     Query created by: Olivia Thakkar on 3/22/2023 12:18 PM      Electronically signed by:  Cain Lundborg MD 3/22/2023 3:53 PM

## 2023-03-22 NOTE — PROGRESS NOTES
Eosinophils % 0 (L) 0.5 - 7.8 %    Basophils 0 0.0 - 2.0 %    Immature Granulocytes 0 0.0 - 5.0 %    Segs Absolute 7.9 1.7 - 8.2 K/UL    Absolute Lymph # 0.1 (L) 0.5 - 4.6 K/UL    Absolute Mono # 0.1 0.1 - 1.3 K/UL    Absolute Eos # 0.0 0.0 - 0.8 K/UL    Basophils Absolute 0.0 0.0 - 0.2 K/UL    Absolute Immature Granulocyte 0.0 0.0 - 0.5 K/UL   CBC with Auto Differential    Collection Time: 03/21/23 10:54 AM   Result Value Ref Range    WBC 11.3 (H) 4.3 - 11.1 K/uL    RBC 2.85 (L) 4.05 - 5.2 M/uL    Hemoglobin 8.0 (L) 11.7 - 15.4 g/dL    Hematocrit 25.5 (L) 35.8 - 46.3 %    MCV 89.5 82.0 - 102.0 FL    MCH 28.1 26.1 - 32.9 PG    MCHC 31.4 31.4 - 35.0 g/dL    RDW 14.6 11.9 - 14.6 %    Platelets 049 748 - 402 K/uL    MPV 8.3 (L) 9.4 - 12.3 FL    nRBC 0.00 0.0 - 0.2 K/uL    Differential Type AUTOMATED      Seg Neutrophils 96 (H) 43 - 78 %    Lymphocytes 1 (L) 13 - 44 %    Monocytes 2 (L) 4.0 - 12.0 %    Eosinophils % 0 (L) 0.5 - 7.8 %    Basophils 0 0.0 - 2.0 %    Immature Granulocytes 1 0.0 - 5.0 %    Segs Absolute 10.8 (H) 1.7 - 8.2 K/UL    Absolute Lymph # 0.2 (L) 0.5 - 4.6 K/UL    Absolute Mono # 0.2 0.1 - 1.3 K/UL    Absolute Eos # 0.0 0.0 - 0.8 K/UL    Basophils Absolute 0.0 0.0 - 0.2 K/UL    Absolute Immature Granulocyte 0.1 0.0 - 0.5 K/UL   CBC with Auto Differential    Collection Time: 03/21/23  6:12 PM   Result Value Ref Range    WBC 19.0 (H) 4.3 - 11.1 K/uL    RBC 2.59 (L) 4.05 - 5.2 M/uL    Hemoglobin 7.3 (L) 11.7 - 15.4 g/dL    Hematocrit 23.1 (L) 35.8 - 46.3 %    MCV 89.2 82.0 - 102.0 FL    MCH 28.2 26.1 - 32.9 PG    MCHC 31.6 31.4 - 35.0 g/dL    RDW 14.7 (H) 11.9 - 14.6 %    Platelets 419 037 - 425 K/uL    MPV 8.5 (L) 9.4 - 12.3 FL    nRBC 0.00 0.0 - 0.2 K/uL    Differential Type AUTOMATED      Seg Neutrophils 95 (H) 43 - 78 %    Lymphocytes 1 (L) 13 - 44 %    Monocytes 3 (L) 4.0 - 12.0 %    Eosinophils % 0 (L) 0.5 - 7.8 %    Basophils 0 0.0 - 2.0 %    Immature Granulocytes 1 0.0 - 5.0 %    Segs Absolute 18.1 (H) 1.7 - 8.2 K/UL    Absolute Lymph # 0.2 (L) 0.5 - 4.6 K/UL    Absolute Mono # 0.6 0.1 - 1.3 K/UL    Absolute Eos # 0.0 0.0 - 0.8 K/UL    Basophils Absolute 0.0 0.0 - 0.2 K/UL    Absolute Immature Granulocyte 0.1 0.0 - 0.5 K/UL   CBC with Auto Differential    Collection Time: 03/22/23  3:07 AM   Result Value Ref Range    WBC 27.5 (H) 4.3 - 11.1 K/uL    RBC 3.11 (L) 4.05 - 5.2 M/uL    Hemoglobin 8.6 (L) 11.7 - 15.4 g/dL    Hematocrit 27.5 (L) 35.8 - 46.3 %    MCV 88.4 82.0 - 102.0 FL    MCH 27.7 26.1 - 32.9 PG    MCHC 31.3 (L) 31.4 - 35.0 g/dL    RDW 14.7 (H) 11.9 - 14.6 %    Platelets 330 280 - 123 K/uL    MPV 8.5 (L) 9.4 - 12.3 FL    nRBC 0.00 0.0 - 0.2 K/uL    Differential Type AUTOMATED      Seg Neutrophils 96 (H) 43 - 78 %    Lymphocytes 1 (L) 13 - 44 %    Monocytes 2 (L) 4.0 - 12.0 %    Eosinophils % 0 (L) 0.5 - 7.8 %    Basophils 0 0.0 - 2.0 %    Immature Granulocytes 1 0.0 - 5.0 %    Segs Absolute 26.5 (H) 1.7 - 8.2 K/UL    Absolute Lymph # 0.3 (L) 0.5 - 4.6 K/UL    Absolute Mono # 0.4 0.1 - 1.3 K/UL    Absolute Eos # 0.0 0.0 - 0.8 K/UL    Basophils Absolute 0.0 0.0 - 0.2 K/UL    Absolute Immature Granulocyte 0.2 0.0 - 0.5 K/UL   Comprehensive Metabolic Panel w/ Reflex to MG    Collection Time: 03/22/23  3:07 AM   Result Value Ref Range    Sodium 132 (L) 133 - 143 mmol/L    Potassium 4.1 3.5 - 5.1 mmol/L    Chloride 96 (L) 101 - 110 mmol/L    CO2 32 21 - 32 mmol/L    Anion Gap 4 2 - 11 mmol/L    Glucose 157 (H) 65 - 100 mg/dL    BUN 54 (H) 8 - 23 MG/DL    Creatinine 2.24 (H) 0.6 - 1.0 MG/DL    Est, Glom Filt Rate 20 (L) >60 ml/min/1.73m2    Calcium 8.8 8.3 - 10.4 MG/DL    Total Bilirubin 0.5 0.2 - 1.1 MG/DL    ALT 47 12 - 65 U/L    AST 63 (H) 15 - 37 U/L    Alk Phosphatase 74 50 - 136 U/L    Total Protein 8.1 6.3 - 8.2 g/dL    Albumin 2.5 (L) 3.2 - 4.6 g/dL    Globulin 5.6 (H) 2.8 - 4.5 g/dL    Albumin/Globulin Ratio 0.4 0.4 - 1.6         I have personally reviewed imaging studies:  Other Studies:  XR

## 2023-03-22 NOTE — DEATH NOTES
Death Pronouncement Note  Patient's Name: Danita Lopez   Patient's YOB: 1931  MRN Number: [id number]    Admitting Provider: Violet Melgar MD  Attending Provider: Lola Newman MD    Patient was examined and the following were absent: Pulses, Blood Pressure, and Respiratory effort    I declared the patient dead on 3/22/2023 at 5:08 PM    Preliminary Cause of Death: Acute respiratory failure with hypoxia [facility])     Electronically signed by Rei Jeffries MD on 3/22/23 at 5:27 PM EDT

## 2023-03-22 NOTE — DISCHARGE SUMMARY
Hospitalist Discharge Summary for  Patient   Admit Date:  3/20/2023  4:42 PM   DC Note date: 3/22/2023  Name:  Adela Arevalo   Age:  80 y.o. Sex:  female  :  1931   MRN:  [id number]   Room:  [facility]  PCP:  Gerson River DO    Presenting Complaint: Shortness of Breath     Initial Admission Diagnosis: Acute on chronic respiratory failure with hypoxia and hypercapnia (HCC) [J96.21, J96.22]  Hypervolemia, unspecified hypervolemia type [E87.70]  Multifocal pneumonia [J18.9]  Acute on chronic congestive heart failure, unspecified heart failure type ([address] [I50.9]     Problem List for this Hospitalization (present on admission):    Principal Problem:    Multifocal pneumonia  Active Problems:    Chronic systolic (congestive) heart failure    Acute renal failure superimposed on stage 3a chronic kidney disease (HCC)    Acute on chronic respiratory failure with hypoxia (HCC)    Gastroesophageal reflux    COPD (chronic obstructive pulmonary disease) (HCC)    Chronic diastolic congestive heart failure (HCC)    Pulmonary HTN ([address]  Resolved Problems:    * No resolved hospital problems. *      Hospital Course:  80 y.o. female with medical history of COPD on 3-4 L/min of oxygen at baseline, CHF with diastolic dysfunction, CKD stage III who presented with shortness of breath for 4 days prior to admission. Patient had wheezing, cough, sputum production. Patient denied fever, chills, vomiting. In ER patient had white blood cell 13.7, creatinine 2.7, proBNP was 1844. COVID and flu testing were negative. She was admitted to ICU. Initially patient was placed on CPAP to help with work of breathing. Chest x-ray shows multifocal pneumonia. Patient was started on empiric antibiotics, including ceftriaxone and doxycycline. On 3/22 she expressed a desire to be made comfort measures and passed away a few hours later.     Time of Death:   17:08    Cause of Death:   Acute respiratory failure with hypoxia due are any questions regarding this examination please feel free to contact a radiologist at [phone]. [address]  [facility] 3/21/2023 5:53:00 PM    XR CHEST PORTABLE    Result Date: 3/20/2023  EXAMINATION: XR CHEST PORTABLE  DATE OF EXAM: 3/20/2023 4:52 PM HISTORY: Sepsis COMPARISON: Every 17 2023. FINDINGS: Hardware:  None present. Lung Parenchyma: There is worsening multifocal lung consolidation. Increased in both upper lobes and left lower lobe. Stable and right lower lobe. Pleura: Stable moderate right pleural effusion. Cardiomediastinal contour: Cardiac silhouette unremarkable. Mediastinal contour unremarkable. Bones:Unremarkable. 1.  Worsening multifocal pneumonia. 2.  Stable moderate right pleural effusion. Thank you for the referral of this patient. This exam was interpreted by an American Board of Radiology certified radiologist with subspecialty fellowship in [place] [address]. If there are questions about this or other reports you can contact a radiologist directly at [phone]   You can also reach me directly at [email or web address]   Soren Escobar M.D., [facility] 3/20/2023 5:20:00 PM        Labs: Results:       BMP, Mg, Phos Recent Labs     03/20/23  1717 03/21/23  0300 03/22/23  0307   * 134 132*   K 3.9 4.0 4.1   CL 93* 97* 96*   CO2 34* 33* 32   BUN 48* 48* 54*      CBC Recent Labs     03/21/23  1054 03/21/23  1812 03/22/23  0307   WBC 11.3* 19.0* 27.5*   RBC 2.85* 2.59* 3.11*   HGB 8.0* 7.3* 8.6*   HCT 25.5* 23.1* 27.5*    225 279      LFT Recent Labs     03/20/23  1717 03/21/23  0300 03/22/23  0307   ALT 21 18 47   GLOB 5.8* 4.8* 5.6*      Cardiac Testing Lab Results   Component Value Date/Time    BNP 1,378 01/21/2022 04:17 PM      Coagulation Tests No results found for: INR, APTT   A1c No results found for: HBA1C   Lipid Panel Lab Results   Component Value Date/Time    CHOL 235 04/27/2022 10:53 AM    HDL 88 04/27/2022 10:53 AM    VLDL 12 04/27/2022 10:53 AM      Thyroid Panel Lab

## 2023-03-22 NOTE — PROGRESS NOTES
Patient is awake and needing Neb Tx. Sao2 91% on BIPAP with 80% O2, RR 32, Rhales and Rhonci through out chest.  Changed to Airvo with 60L/m and 90% O2 for Tx.

## 2023-03-22 NOTE — PROGRESS NOTES
Family at bedside wishing to proceed with comfort measures and remove BiPAP mask. MD made aware. RT on the way to bedside.

## 2023-03-22 NOTE — RT PROTOCOL NOTE
Number: [id number]    Title: Patient Care Assessment Protocol    Effective Date: 01/1999    Revised Date: 05/2014, 04/2018, 12/2018, 07/2019    Reviewed Date: 06/2013/ 03/2015, 03/2016, 06/2017, 11/2020        Overview  In an effort to improve quality and reduce costs of respiratory care at [facility], the Respiratory Department has developed a number of Patient Care Protocols. These protocols have been developed according to [employer]a 3 and are utilized for those patients who are ordered respiratory therapy using therapeutic indications and standardized approaches for accomplishing objectives. Patient Care Protocols are intended to improve care by:  Defining the indications and standards of care agreed upon by the Pulmonary Medicine and [address] [place] [address] of [facility]. Training respiratory care practitioners to apply those criteria to individual patients and modify therapy as indicated by the protocols. Documenting the indication and care plan as part of the initial ordering process. Tapering or discontinuing treatments once the indication for therapy changes. The Patient Care Protocols shall be universally applied throughout the hospital as determined by   the Pulmonary Medicine and [address] [place] [address].     Rationale for Patient Care Assessment Protocols:  Continuous Quality Improvement  Cost containment  Standardization of care  Enhanced continuity of care  Utilization review  Timely intervention    The following patient care assessment protocols have been developed:  Aerosolized Medication Protocol   Bronchial Hygiene Protocol   Oxygen Protocol  CVRU Fast Track Weaning Protocol   Asthma Treatment Protocol ER  Pediatric Asthma Treatment Protocol ER  Alpha-1 Antitrypsin Deficiency Protocol  Prone Positioning Protocol   COPD Protocol   Home Oxygen Assessment Protocol  Ventilator Weaning Protocol   Lung Volume Expansion will be applied: All non- critical care patients will be evaluated upon receiving initial respiratory care orders within 24 hours and re-evaluated within 48 hours (or more as needed). Orders requesting a Respiratory Consult will be responded to in the following manner: In patient emergency situations, the RCP assigned to the floor will respond immediately to the patient, provide an initial respiratory assessment, and contact the patient's physician as necessary for appropriate orders. In non-emergent situations, the RCP assigned to the floor will respond to the patient within 90 minutes and provide an initial respiratory assessment and contact patient's physician as necessary for appropriate orders. An RCP will provide a comprehensive assessment as soon as possible. Upon completion of an evaluation, the RCP will complete documentation in the patient's EMR in the RT Assessment section. The RCP who completes the assessment will document orders for therapy in the orders section of the patient's EMR selecting new order. Next, per protocol should be selected indicating it is a protocol order and sign orders should be selected to complete the process. The applicable protocol must be added to the progress note per Joint Commission guidelines. The Pharmacy and Therapeutics (P&T) Committee has mandated that the medication Xopenex may be changed to unit dose albuterol without an order, except for those patients receiving Xopenex due to cardiac arrhythmias. The dosage for these patients should be 0.63 mg. and may be changed from 1.25 mg. to 0.63 mg per P & T Committee by the RCP completing the assessment. Patients who are not experiencing cardiac arrhythmias, and are ordered Xopenex and Atrovent may be changed to Duoneb. VII. Safety: The following safety issues shall be monitored:   The RCP will perform hand hygiene per hospital policy utilizing Standard Precautions for all patients and following

## 2023-03-22 NOTE — PROGRESS NOTES
Patient removed from BiPAP per compassionate request by family.      Patient placed on 2L nasal cannula:        03/22/23 1655   Oxygen Therapy/Pulse Ox   O2 Device Nasal cannula   O2 Flow Rate (L/min) 2 L/min   Heart Rate 76   SpO2 93 %

## 2023-03-22 NOTE — PROGRESS NOTES
Message left with sister to call hospital for an update. Patient not tolerating coming off bipap to airvo, more restless and confused.

## 2023-03-22 NOTE — CARE COORDINATION
Interdisciplinary team meeting with attending, CM, nursing, PT and nutritional services for plan of care Patient currently on BIPAP. Attending to address code status and possible hospice. Received hospice consult and referral to [address] for possible hospice house. CM contacted [facility] liaison about referral and patient on BIPAP. Hospice house does not do BIPAP but will do arivo. CM spoke with patient nurse and she states she attempted to reach patient sister but she is at the niece's . Patient has a neighbor visiting with her that is trying to reach the sister. Per patient yesterday when CM saw patient she only has her sister for family. CM following.

## 2023-03-22 NOTE — WOUND CARE
Noted nursing reports small shallow open area on bottom, noted nursing using silicone foam. Noted patient is now comfort cares, formal consult and assessment held. Please call if something changes. Will sign off.

## 2023-03-22 NOTE — ACP (ADVANCE CARE PLANNING)
Health Maintenance Due   Topic Date Due   • Depression Screening  Never done   • Cervical Cancer Screen 30-64 -  Never done   • Colorectal Cancer Screen-  Never done   • Shingles Vaccine (1 of 2) Never done   • DTaP/Tdap/Td Vaccine (2 - Td or Tdap) 09/28/2017   • Breast Cancer Screening  Never done   • COVID-19 Vaccine (3 - Booster for Pfizer series) 06/11/2022       Patient is due for topics as listed above but is not proceeding with Immunization(s) COVID-19, Dtap/Tdap/Td and Shingles, Cervical cancer screening, Colorectal Cancer Screening: Colonoscopy and Mammogram at this time.    I evaluated and examined patient this morning at bedside. Discussed with patient about worsening respiratory status and difficult to be weaned off of BiPAP. Patient reports feeling uncomfortable on BiPAP and would want to get comfortable. Discussed about administering morphine to help with patient's dyspnea, patient is agreeable to it. Patient does not wish to be on life support or receive any resuscitation including chest compressions or cardiac medications. Patient has advanced directive stating DNR/DNI. Patient has expressed wishes to be comfortable and to die in peace.

## 2023-03-22 NOTE — PROGRESS NOTES
03/22/23 0038   NIV Type   Skin Assessment Clean, dry, & intact   Skin Protection for O2 Device No   Suction Setup and Functional Yes   NIV Started/Stopped On   Equipment Type V60   Mode CPAP   Mask Type Full face mask   Mask Size Medium   Settings/Measurements   PIP Observed 17 cm H20   CPAP/EPAP 14 cmH2O   Vt (Measured) 344 mL   Resp 29   FiO2  60 %   Minute Volume (L/min) 10 Liters   Comfort Level Good   Using Accessory Muscles No   SpO2 94   Patient's Home Machine No   Alarm Settings   Alarms On Y   Low Pressure (cmH2O) 5 cmH2O   High Pressure (cmH2O) 30 cmH2O   Delay Alarm 20 sec(s)   Apnea (secs) 20 secs   RR Low (bpm) 8   RR High (bpm) 50 br/min   Breath Sounds   Right Upper Lobe Coarse Crackles; Rhonchi   Right Middle Lobe Diminished;Coarse Crackles; Rhonchi   Right Lower Lobe Diminished;Coarse Crackles   Left Upper Lobe Diminished;Coarse Crackles; Rhonchi   Left Lower Lobe Diminished;Coarse Crackles; Rhonchi   Patient awakened at 0012 in moderate distress,   Sao2 decreased to 80% and trying to cough up secretions. Removed from CPAP and placed on Airvo to give HHN with Duoneb. Patient coughed up large amounts of thick blood tinged secretions. After 30 minutes, patient returned to CPAP with full mask. Sao2 remained 87-88% on 45% O2, increased to 60%. Patient able to communicate that she felt better and that there was improvement in her ability to breathe.
